# Patient Record
Sex: MALE | Race: WHITE | NOT HISPANIC OR LATINO | Employment: OTHER | URBAN - NONMETROPOLITAN AREA
[De-identification: names, ages, dates, MRNs, and addresses within clinical notes are randomized per-mention and may not be internally consistent; named-entity substitution may affect disease eponyms.]

---

## 2017-04-26 ENCOUNTER — OFFICE VISIT (OUTPATIENT)
Dept: OPHTHALMOLOGY | Facility: CLINIC | Age: 77
End: 2017-04-26

## 2017-04-26 DIAGNOSIS — H25.013 CORTICAL AGE-RELATED CATARACT, BILATERAL: ICD-10-CM

## 2017-04-26 DIAGNOSIS — H40.1131 PRIMARY OPEN ANGLE GLAUCOMA OF BOTH EYES, MILD STAGE: Primary | ICD-10-CM

## 2017-04-26 DIAGNOSIS — IMO0002 NUCLEAR CATARACT, BILATERAL: ICD-10-CM

## 2017-04-26 PROCEDURE — 92133 CPTRZD OPH DX IMG PST SGM ON: CPT | Performed by: OPHTHALMOLOGY

## 2017-04-26 PROCEDURE — 99214 OFFICE O/P EST MOD 30 MIN: CPT | Performed by: OPHTHALMOLOGY

## 2017-04-26 RX ORDER — LATANOPROST 50 UG/ML
1 SOLUTION/ DROPS OPHTHALMIC NIGHTLY
Qty: 1 EACH | Refills: 11 | Status: SHIPPED | OUTPATIENT
Start: 2017-04-26 | End: 2017-08-28 | Stop reason: SDUPTHER

## 2017-04-26 NOTE — PROGRESS NOTES
Rolando De Leon is a 76 y.o. male.   Chief Complaint   Patient presents with   • Cataract   • Glaucoma         Review of Systems    Objective   Base Eye Exam     Visual Acuity (Snellen - Linear)      Right Left   Dist sc 20/40 -2 20/40 -2         Tonometry (Applanation, 2:23 PM)      Right Left   Pressure 16 17         Pupils      Pupils   Right PERRL   Left PERRL         Extraocular Movement      Right Left   Result Full Full         Dilation     Both eyes:  2.5% Edwin Synephrine, 1.0% Mydriacyl @ 2:26 PM            Slit Lamp and Fundus Exam     External Exam      Right Left    External Normal Normal      Slit Lamp Exam      Right Left    Lids/Lashes 2mm nevus central MARIKA margin Normal    Conjunctiva/Sclera White and quiet White and quiet    Cornea Clear Clear    Anterior Chamber Deep and quiet Deep and quiet    Iris Round and reactive Round and reactive    Lens 1+ Nuclear sclerosis, 1+ Cortical cataract 1+ Nuclear sclerosis, 1+ Cortical cataract    Vitreous Normal Normal      Fundus Exam      Right Left    Disc rim 0.15 ST, 0.3 IT, no chnage photo rim 0.1 St, 0.15 IT, no change photo    Macula Normal Normal    Vessels Normal Normal    Periphery Normal Normal              OCT Disc:   OD- relative inferior thinning and not progressed  OS- superior thinning, relative inferior thinning and not progressed    Assessment/Plan   Diagnoses and all orders for this visit:    Primary open angle glaucoma of both eyes, mild stage  -     latanoprost (XALATAN) 0.005 % ophthalmic solution; Administer 1 drop into the left eye Every Night.    Nuclear cataract, bilateral    Cortical age-related cataract, bilateral      Plan:     Continue current drops    Return in about 4 months (around 8/26/2017) for Visual Field 24-2.

## 2017-08-28 ENCOUNTER — OFFICE VISIT (OUTPATIENT)
Dept: OPHTHALMOLOGY | Facility: CLINIC | Age: 77
End: 2017-08-28

## 2017-08-28 DIAGNOSIS — H40.1131 PRIMARY OPEN ANGLE GLAUCOMA OF BOTH EYES, MILD STAGE: Primary | ICD-10-CM

## 2017-08-28 DIAGNOSIS — H25.013 CORTICAL AGE-RELATED CATARACT, BILATERAL: ICD-10-CM

## 2017-08-28 DIAGNOSIS — IMO0002 NUCLEAR CATARACT, BILATERAL: ICD-10-CM

## 2017-08-28 PROCEDURE — 99213 OFFICE O/P EST LOW 20 MIN: CPT | Performed by: OPHTHALMOLOGY

## 2017-08-28 PROCEDURE — 92083 EXTENDED VISUAL FIELD XM: CPT | Performed by: OPHTHALMOLOGY

## 2017-08-28 RX ORDER — LATANOPROST 50 UG/ML
1 SOLUTION/ DROPS OPHTHALMIC NIGHTLY
Qty: 1 EACH | Refills: 11 | Status: SHIPPED | OUTPATIENT
Start: 2017-08-28 | End: 2022-01-01

## 2017-08-28 NOTE — PROGRESS NOTES
Subjective   Eduardo De Leon is a 77 y.o. male.   Chief Complaint   Patient presents with   • Diabetic Eye Exam   • Glaucoma     HPI     Diabetic Eye Exam   Vision: is stable   Associated symptoms: Negative for blurred vision   Diabetes Type: Type 2   Duration: years   Blood Sugars: is controlled           Glaucoma   Laterality: both eyes   Compliance with Treatment: always           Comments   4 month vf 24-2       Last edited by Rod Hanley MD on 8/28/2017  2:14 PM. (History)          Review of Systems    Objective   Base Eye Exam     Visual Acuity (Snellen - Linear)      Right Left   Dist cc 20/60 20/50         Tonometry (Applanation, 2:14 PM)      Right Left   Pressure 16 15               Slit Lamp and Fundus Exam     External Exam      Right Left    External Normal Normal      Slit Lamp Exam      Right Left    Lids/Lashes 2mm nevus central MARIKA margin Normal    Conjunctiva/Sclera White and quiet White and quiet    Cornea Clear Clear    Anterior Chamber Deep and quiet Deep and quiet    Iris Round and reactive Round and reactive    Lens 1+ Nuclear sclerosis, 1+ Cortical cataract 1+ Nuclear sclerosis, 1+ Cortical cataract    Vitreous Normal Normal      Fundus Exam      Right Left    Disc rim 0.15 ST, 0.3 IT, no chnage photo rim 0.1 St, 0.15 IT, no change photo    Macula Normal Normal    Vessels Normal Normal            ./588    Roque Visual Field :   OD- inf temp periph constriction, ? artifact  OS- inf nasal step, no change      Assessment/Plan    Diagnoses and all orders for this visit:    Primary open angle glaucoma of both eyes, mild stage  -     latanoprost (XALATAN) 0.005 % ophthalmic solution; Administer 1 drop into the left eye Every Night.    Nuclear cataract, bilateral    Cortical age-related cataract, bilateral    Plan:   cont latanoprost  rec ophth 4 months

## 2020-06-30 ENCOUNTER — APPOINTMENT (OUTPATIENT)
Dept: CT IMAGING | Facility: HOSPITAL | Age: 80
End: 2020-06-30

## 2020-06-30 ENCOUNTER — HOSPITAL ENCOUNTER (INPATIENT)
Facility: HOSPITAL | Age: 80
LOS: 2 days | Discharge: HOME | End: 2020-07-03
Attending: FAMILY MEDICINE | Admitting: INTERNAL MEDICINE

## 2020-06-30 ENCOUNTER — APPOINTMENT (OUTPATIENT)
Dept: GENERAL RADIOLOGY | Facility: HOSPITAL | Age: 80
End: 2020-06-30

## 2020-06-30 DIAGNOSIS — R40.4 TRANSIENT ALTERATION OF AWARENESS: Primary | ICD-10-CM

## 2020-06-30 DIAGNOSIS — Z74.09 IMPAIRED MOBILITY AND ACTIVITIES OF DAILY LIVING: ICD-10-CM

## 2020-06-30 DIAGNOSIS — E83.42 HYPOMAGNESEMIA: ICD-10-CM

## 2020-06-30 DIAGNOSIS — Z78.9 IMPAIRED MOBILITY AND ACTIVITIES OF DAILY LIVING: ICD-10-CM

## 2020-06-30 DIAGNOSIS — R91.8 OPACITY OF LUNG ON IMAGING STUDY: ICD-10-CM

## 2020-06-30 DIAGNOSIS — R26.89 DECREASED FUNCTIONAL MOBILITY: ICD-10-CM

## 2020-06-30 LAB
ALBUMIN SERPL-MCNC: 4.4 G/DL (ref 3.5–5.2)
ALBUMIN/GLOB SERPL: 1.3 G/DL
ALP SERPL-CCNC: 68 U/L (ref 39–117)
ALT SERPL W P-5'-P-CCNC: 6 U/L (ref 1–41)
AMPHET+METHAMPHET UR QL: NEGATIVE
AMPHETAMINES UR QL: NEGATIVE
ANION GAP SERPL CALCULATED.3IONS-SCNC: 16 MMOL/L (ref 5–15)
AST SERPL-CCNC: 17 U/L (ref 1–40)
B PERT DNA SPEC QL NAA+PROBE: NOT DETECTED
BACTERIA UR QL AUTO: ABNORMAL /HPF
BARBITURATES UR QL SCN: NEGATIVE
BASOPHILS # BLD AUTO: 0.06 10*3/MM3 (ref 0–0.2)
BASOPHILS NFR BLD AUTO: 0.5 % (ref 0–1.5)
BENZODIAZ UR QL SCN: NEGATIVE
BILIRUB SERPL-MCNC: 0.3 MG/DL (ref 0.2–1.2)
BILIRUB UR QL STRIP: NEGATIVE
BUN SERPL-MCNC: 20 MG/DL (ref 8–23)
BUN/CREAT SERPL: 16.1 (ref 7–25)
BUPRENORPHINE SERPL-MCNC: NEGATIVE NG/ML
C PNEUM DNA NPH QL NAA+NON-PROBE: NOT DETECTED
CALCIUM SPEC-SCNC: 10 MG/DL (ref 8.6–10.5)
CANNABINOIDS SERPL QL: NEGATIVE
CHLORIDE SERPL-SCNC: 102 MMOL/L (ref 98–107)
CK SERPL-CCNC: 349 U/L (ref 20–200)
CLARITY UR: CLEAR
CO2 SERPL-SCNC: 22 MMOL/L (ref 22–29)
COCAINE UR QL: NEGATIVE
COLOR UR: YELLOW
CREAT SERPL-MCNC: 1.24 MG/DL (ref 0.76–1.27)
CRP SERPL-MCNC: 3.77 MG/DL (ref 0–0.5)
D-LACTATE SERPL-SCNC: 1.9 MMOL/L (ref 0.5–2)
DEPRECATED RDW RBC AUTO: 47.5 FL (ref 37–54)
EOSINOPHIL # BLD AUTO: 0.02 10*3/MM3 (ref 0–0.4)
EOSINOPHIL NFR BLD AUTO: 0.2 % (ref 0.3–6.2)
ERYTHROCYTE [DISTWIDTH] IN BLOOD BY AUTOMATED COUNT: 14.5 % (ref 12.3–15.4)
ETHANOL BLD-MCNC: <10 MG/DL (ref 0–10)
ETHANOL UR QL: <0.01 %
FLUAV H1 2009 PAND RNA NPH QL NAA+PROBE: NOT DETECTED
FLUAV H1 HA GENE NPH QL NAA+PROBE: NOT DETECTED
FLUAV H3 RNA NPH QL NAA+PROBE: NOT DETECTED
FLUAV SUBTYP SPEC NAA+PROBE: NOT DETECTED
FLUBV RNA ISLT QL NAA+PROBE: NOT DETECTED
GFR SERPL CREATININE-BSD FRML MDRD: 57 ML/MIN/1.73
GLOBULIN UR ELPH-MCNC: 3.3 GM/DL
GLUCOSE SERPL-MCNC: 143 MG/DL (ref 65–99)
GLUCOSE UR STRIP-MCNC: NEGATIVE MG/DL
HADV DNA SPEC NAA+PROBE: NOT DETECTED
HCOV 229E RNA SPEC QL NAA+PROBE: NOT DETECTED
HCOV HKU1 RNA SPEC QL NAA+PROBE: NOT DETECTED
HCOV NL63 RNA SPEC QL NAA+PROBE: NOT DETECTED
HCOV OC43 RNA SPEC QL NAA+PROBE: NOT DETECTED
HCT VFR BLD AUTO: 35.7 % (ref 37.5–51)
HGB BLD-MCNC: 11.5 G/DL (ref 13–17.7)
HGB UR QL STRIP.AUTO: ABNORMAL
HMPV RNA NPH QL NAA+NON-PROBE: NOT DETECTED
HOLD SPECIMEN: NORMAL
HOLD SPECIMEN: NORMAL
HPIV1 RNA SPEC QL NAA+PROBE: NOT DETECTED
HPIV2 RNA SPEC QL NAA+PROBE: NOT DETECTED
HPIV3 RNA NPH QL NAA+PROBE: NOT DETECTED
HPIV4 P GENE NPH QL NAA+PROBE: NOT DETECTED
HYALINE CASTS UR QL AUTO: ABNORMAL /LPF
IMM GRANULOCYTES # BLD AUTO: 0.03 10*3/MM3 (ref 0–0.05)
IMM GRANULOCYTES NFR BLD AUTO: 0.3 % (ref 0–0.5)
KETONES UR QL STRIP: ABNORMAL
LDH SERPL-CCNC: 166 U/L (ref 135–225)
LEUKOCYTE ESTERASE UR QL STRIP.AUTO: NEGATIVE
LIPASE SERPL-CCNC: 45 U/L (ref 13–60)
LYMPHOCYTES # BLD AUTO: 1.49 10*3/MM3 (ref 0.7–3.1)
LYMPHOCYTES NFR BLD AUTO: 12.7 % (ref 19.6–45.3)
M PNEUMO IGG SER IA-ACNC: NOT DETECTED
MAGNESIUM SERPL-MCNC: 1.5 MG/DL (ref 1.6–2.4)
MCH RBC QN AUTO: 28.9 PG (ref 26.6–33)
MCHC RBC AUTO-ENTMCNC: 32.2 G/DL (ref 31.5–35.7)
MCV RBC AUTO: 89.7 FL (ref 79–97)
METHADONE UR QL SCN: NEGATIVE
MONOCYTES # BLD AUTO: 0.85 10*3/MM3 (ref 0.1–0.9)
MONOCYTES NFR BLD AUTO: 7.3 % (ref 5–12)
NEUTROPHILS NFR BLD AUTO: 79 % (ref 42.7–76)
NEUTROPHILS NFR BLD AUTO: 9.26 10*3/MM3 (ref 1.7–7)
NITRITE UR QL STRIP: NEGATIVE
NRBC BLD AUTO-RTO: 0 /100 WBC (ref 0–0.2)
NT-PROBNP SERPL-MCNC: 1107 PG/ML (ref 5–1800)
OPIATES UR QL: POSITIVE
OXYCODONE UR QL SCN: NEGATIVE
PCP UR QL SCN: NEGATIVE
PH UR STRIP.AUTO: 7.5 [PH] (ref 5–9)
PLATELET # BLD AUTO: 309 10*3/MM3 (ref 140–450)
PMV BLD AUTO: 11 FL (ref 6–12)
POTASSIUM SERPL-SCNC: 4 MMOL/L (ref 3.5–5.2)
PROCALCITONIN SERPL-MCNC: 0.14 NG/ML (ref 0.1–0.25)
PROPOXYPH UR QL: NEGATIVE
PROT SERPL-MCNC: 7.7 G/DL (ref 6–8.5)
PROT UR QL STRIP: NEGATIVE
RBC # BLD AUTO: 3.98 10*6/MM3 (ref 4.14–5.8)
RBC # UR: ABNORMAL /HPF
REF LAB TEST METHOD: ABNORMAL
RHINOVIRUS RNA SPEC NAA+PROBE: NOT DETECTED
RSV RNA NPH QL NAA+NON-PROBE: NOT DETECTED
SODIUM SERPL-SCNC: 140 MMOL/L (ref 136–145)
SP GR UR STRIP: 1.01 (ref 1–1.03)
SQUAMOUS #/AREA URNS HPF: ABNORMAL /HPF
TRICYCLICS UR QL SCN: NEGATIVE
TROPONIN T SERPL-MCNC: 0.01 NG/ML (ref 0–0.03)
UROBILINOGEN UR QL STRIP: ABNORMAL
WBC # BLD AUTO: 11.71 10*3/MM3 (ref 3.4–10.8)
WBC UR QL AUTO: ABNORMAL /HPF
WHOLE BLOOD HOLD SPECIMEN: NORMAL
WHOLE BLOOD HOLD SPECIMEN: NORMAL

## 2020-06-30 PROCEDURE — 93005 ELECTROCARDIOGRAM TRACING: CPT | Performed by: FAMILY MEDICINE

## 2020-06-30 PROCEDURE — G0378 HOSPITAL OBSERVATION PER HR: HCPCS

## 2020-06-30 PROCEDURE — 87635 SARS-COV-2 COVID-19 AMP PRB: CPT | Performed by: PHYSICIAN ASSISTANT

## 2020-06-30 PROCEDURE — 80307 DRUG TEST PRSMV CHEM ANLYZR: CPT | Performed by: PHYSICIAN ASSISTANT

## 2020-06-30 PROCEDURE — P9612 CATHETERIZE FOR URINE SPEC: HCPCS

## 2020-06-30 PROCEDURE — 86140 C-REACTIVE PROTEIN: CPT | Performed by: PHYSICIAN ASSISTANT

## 2020-06-30 PROCEDURE — 93010 ELECTROCARDIOGRAM REPORT: CPT | Performed by: INTERNAL MEDICINE

## 2020-06-30 PROCEDURE — 99285 EMERGENCY DEPT VISIT HI MDM: CPT

## 2020-06-30 PROCEDURE — 83880 ASSAY OF NATRIURETIC PEPTIDE: CPT | Performed by: PHYSICIAN ASSISTANT

## 2020-06-30 PROCEDURE — 71045 X-RAY EXAM CHEST 1 VIEW: CPT

## 2020-06-30 PROCEDURE — 80053 COMPREHEN METABOLIC PANEL: CPT | Performed by: PHYSICIAN ASSISTANT

## 2020-06-30 PROCEDURE — 87040 BLOOD CULTURE FOR BACTERIA: CPT | Performed by: PHYSICIAN ASSISTANT

## 2020-06-30 PROCEDURE — 81001 URINALYSIS AUTO W/SCOPE: CPT | Performed by: PHYSICIAN ASSISTANT

## 2020-06-30 PROCEDURE — 83690 ASSAY OF LIPASE: CPT | Performed by: PHYSICIAN ASSISTANT

## 2020-06-30 PROCEDURE — 70450 CT HEAD/BRAIN W/O DYE: CPT

## 2020-06-30 PROCEDURE — 25010000002 CEFTRIAXONE: Performed by: PHYSICIAN ASSISTANT

## 2020-06-30 PROCEDURE — 83735 ASSAY OF MAGNESIUM: CPT | Performed by: EMERGENCY MEDICINE

## 2020-06-30 PROCEDURE — 25010000002 MAGNESIUM SULFATE 2 GM/50ML SOLUTION: Performed by: PHYSICIAN ASSISTANT

## 2020-06-30 PROCEDURE — 82550 ASSAY OF CK (CPK): CPT | Performed by: PHYSICIAN ASSISTANT

## 2020-06-30 PROCEDURE — 84145 PROCALCITONIN (PCT): CPT | Performed by: PHYSICIAN ASSISTANT

## 2020-06-30 PROCEDURE — 84484 ASSAY OF TROPONIN QUANT: CPT | Performed by: PHYSICIAN ASSISTANT

## 2020-06-30 PROCEDURE — 83615 LACTATE (LD) (LDH) ENZYME: CPT | Performed by: PHYSICIAN ASSISTANT

## 2020-06-30 PROCEDURE — 85025 COMPLETE CBC W/AUTO DIFF WBC: CPT | Performed by: PHYSICIAN ASSISTANT

## 2020-06-30 PROCEDURE — 83605 ASSAY OF LACTIC ACID: CPT | Performed by: EMERGENCY MEDICINE

## 2020-06-30 PROCEDURE — 0099U HC BIOFIRE FILMARRAY RESP PANEL 1: CPT | Performed by: PHYSICIAN ASSISTANT

## 2020-06-30 RX ORDER — MAGNESIUM SULFATE HEPTAHYDRATE 40 MG/ML
2 INJECTION, SOLUTION INTRAVENOUS ONCE
Status: COMPLETED | OUTPATIENT
Start: 2020-06-30 | End: 2020-06-30

## 2020-06-30 RX ORDER — MORPHINE SULFATE 30 MG/1
15 TABLET, FILM COATED, EXTENDED RELEASE ORAL EVERY 8 HOURS SCHEDULED
COMMUNITY
Start: 2020-02-12 | End: 2022-01-01 | Stop reason: HOSPADM

## 2020-06-30 RX ORDER — GABAPENTIN 300 MG/1
300 CAPSULE ORAL EVERY 12 HOURS SCHEDULED
Status: DISCONTINUED | OUTPATIENT
Start: 2020-06-30 | End: 2020-07-03 | Stop reason: HOSPADM

## 2020-06-30 RX ORDER — GABAPENTIN 600 MG/1
600 TABLET ORAL 3 TIMES DAILY
COMMUNITY
Start: 2020-02-13 | End: 2022-01-01

## 2020-06-30 RX ORDER — SODIUM CHLORIDE 0.9 % (FLUSH) 0.9 %
10 SYRINGE (ML) INJECTION AS NEEDED
Status: DISCONTINUED | OUTPATIENT
Start: 2020-06-30 | End: 2020-07-03 | Stop reason: HOSPADM

## 2020-06-30 RX ORDER — PANTOPRAZOLE SODIUM 40 MG/1
40 TABLET, DELAYED RELEASE ORAL
COMMUNITY
Start: 2019-07-02 | End: 2020-07-03 | Stop reason: HOSPADM

## 2020-06-30 RX ORDER — CLOPIDOGREL BISULFATE 75 MG/1
75 TABLET ORAL DAILY
Status: DISCONTINUED | OUTPATIENT
Start: 2020-07-01 | End: 2020-07-03 | Stop reason: HOSPADM

## 2020-06-30 RX ORDER — NICOTINE POLACRILEX 4 MG
15 LOZENGE BUCCAL
Status: DISCONTINUED | OUTPATIENT
Start: 2020-06-30 | End: 2020-07-03 | Stop reason: HOSPADM

## 2020-06-30 RX ORDER — SODIUM CHLORIDE 0.9 % (FLUSH) 0.9 %
10 SYRINGE (ML) INJECTION EVERY 12 HOURS SCHEDULED
Status: DISCONTINUED | OUTPATIENT
Start: 2020-06-30 | End: 2020-07-03 | Stop reason: HOSPADM

## 2020-06-30 RX ORDER — NIFEDIPINE 60 MG/1
60 TABLET, EXTENDED RELEASE ORAL DAILY
COMMUNITY
Start: 2020-05-05 | End: 2021-05-06

## 2020-06-30 RX ORDER — ASPIRIN 81 MG/1
81 TABLET ORAL DAILY
Status: DISCONTINUED | OUTPATIENT
Start: 2020-07-01 | End: 2020-07-03 | Stop reason: HOSPADM

## 2020-06-30 RX ORDER — DEXTROSE MONOHYDRATE 25 G/50ML
25 INJECTION, SOLUTION INTRAVENOUS
Status: DISCONTINUED | OUTPATIENT
Start: 2020-06-30 | End: 2020-07-03 | Stop reason: HOSPADM

## 2020-06-30 RX ORDER — SODIUM CHLORIDE 0.9 % (FLUSH) 0.9 %
10 SYRINGE (ML) INJECTION AS NEEDED
Status: DISCONTINUED | OUTPATIENT
Start: 2020-06-30 | End: 2020-06-30

## 2020-06-30 RX ORDER — ESCITALOPRAM OXALATE 10 MG/1
10 TABLET ORAL DAILY
Status: DISCONTINUED | OUTPATIENT
Start: 2020-07-01 | End: 2020-07-03 | Stop reason: HOSPADM

## 2020-06-30 RX ORDER — ATORVASTATIN CALCIUM 20 MG/1
20 TABLET, FILM COATED ORAL DAILY
Status: DISCONTINUED | OUTPATIENT
Start: 2020-07-01 | End: 2020-07-03 | Stop reason: HOSPADM

## 2020-06-30 RX ORDER — HYDROMORPHONE HYDROCHLORIDE 4 MG/1
4 TABLET ORAL EVERY 6 HOURS PRN
Status: DISCONTINUED | OUTPATIENT
Start: 2020-06-30 | End: 2020-07-03 | Stop reason: HOSPADM

## 2020-06-30 RX ORDER — FENOFIBRATE 145 MG/1
145 TABLET, COATED ORAL DAILY
Status: DISCONTINUED | OUTPATIENT
Start: 2020-07-01 | End: 2020-07-03 | Stop reason: HOSPADM

## 2020-06-30 RX ADMIN — Medication 10 ML: at 18:07

## 2020-06-30 RX ADMIN — MAGNESIUM SULFATE HEPTAHYDRATE 2 G: 40 INJECTION, SOLUTION INTRAVENOUS at 21:44

## 2020-06-30 RX ADMIN — CEFTRIAXONE 1 G: 1 INJECTION, POWDER, FOR SOLUTION INTRAMUSCULAR; INTRAVENOUS at 21:44

## 2020-07-01 PROBLEM — I10 ESSENTIAL HYPERTENSION: Status: ACTIVE | Noted: 2020-07-01

## 2020-07-01 PROBLEM — T40.601A NARCOTIC OVERDOSE (HCC): Status: ACTIVE | Noted: 2020-07-01

## 2020-07-01 PROBLEM — E11.9 TYPE 2 DIABETES MELLITUS (HCC): Status: ACTIVE | Noted: 2020-07-01

## 2020-07-01 PROBLEM — J18.9 CAP (COMMUNITY ACQUIRED PNEUMONIA): Status: ACTIVE | Noted: 2020-07-01

## 2020-07-01 LAB
ANION GAP SERPL CALCULATED.3IONS-SCNC: 11 MMOL/L (ref 5–15)
BASOPHILS # BLD AUTO: 0.05 10*3/MM3 (ref 0–0.2)
BASOPHILS NFR BLD AUTO: 0.7 % (ref 0–1.5)
BUN SERPL-MCNC: 20 MG/DL (ref 8–23)
BUN/CREAT SERPL: 20 (ref 7–25)
CALCIUM SPEC-SCNC: 9.4 MG/DL (ref 8.6–10.5)
CHLORIDE SERPL-SCNC: 105 MMOL/L (ref 98–107)
CO2 SERPL-SCNC: 23 MMOL/L (ref 22–29)
CREAT SERPL-MCNC: 1 MG/DL (ref 0.76–1.27)
DEPRECATED RDW RBC AUTO: 45.6 FL (ref 37–54)
EOSINOPHIL # BLD AUTO: 0.06 10*3/MM3 (ref 0–0.4)
EOSINOPHIL NFR BLD AUTO: 0.8 % (ref 0.3–6.2)
ERYTHROCYTE [DISTWIDTH] IN BLOOD BY AUTOMATED COUNT: 14.1 % (ref 12.3–15.4)
GFR SERPL CREATININE-BSD FRML MDRD: 72 ML/MIN/1.73
GLUCOSE BLDC GLUCOMTR-MCNC: 106 MG/DL (ref 70–130)
GLUCOSE BLDC GLUCOMTR-MCNC: 107 MG/DL (ref 70–130)
GLUCOSE BLDC GLUCOMTR-MCNC: 126 MG/DL (ref 70–130)
GLUCOSE SERPL-MCNC: 113 MG/DL (ref 65–99)
HCT VFR BLD AUTO: 30 % (ref 37.5–51)
HGB BLD-MCNC: 9.9 G/DL (ref 13–17.7)
IMM GRANULOCYTES # BLD AUTO: 0.03 10*3/MM3 (ref 0–0.05)
IMM GRANULOCYTES NFR BLD AUTO: 0.4 % (ref 0–0.5)
LYMPHOCYTES # BLD AUTO: 1.48 10*3/MM3 (ref 0.7–3.1)
LYMPHOCYTES NFR BLD AUTO: 19.5 % (ref 19.6–45.3)
MCH RBC QN AUTO: 29.2 PG (ref 26.6–33)
MCHC RBC AUTO-ENTMCNC: 33 G/DL (ref 31.5–35.7)
MCV RBC AUTO: 88.5 FL (ref 79–97)
MONOCYTES # BLD AUTO: 0.63 10*3/MM3 (ref 0.1–0.9)
MONOCYTES NFR BLD AUTO: 8.3 % (ref 5–12)
NEUTROPHILS NFR BLD AUTO: 5.34 10*3/MM3 (ref 1.7–7)
NEUTROPHILS NFR BLD AUTO: 70.3 % (ref 42.7–76)
NRBC BLD AUTO-RTO: 0 /100 WBC (ref 0–0.2)
PHOSPHATE SERPL-MCNC: 2.7 MG/DL (ref 2.5–4.5)
PLATELET # BLD AUTO: 243 10*3/MM3 (ref 140–450)
PMV BLD AUTO: 11 FL (ref 6–12)
POTASSIUM SERPL-SCNC: 3.9 MMOL/L (ref 3.5–5.2)
PROCALCITONIN SERPL-MCNC: 0.2 NG/ML (ref 0.1–0.25)
RBC # BLD AUTO: 3.39 10*6/MM3 (ref 4.14–5.8)
SARS-COV-2 N GENE RESP QL NAA+PROBE: NOT DETECTED
SODIUM SERPL-SCNC: 139 MMOL/L (ref 136–145)
WBC # BLD AUTO: 7.59 10*3/MM3 (ref 3.4–10.8)

## 2020-07-01 PROCEDURE — 84145 PROCALCITONIN (PCT): CPT | Performed by: INTERNAL MEDICINE

## 2020-07-01 PROCEDURE — 84100 ASSAY OF PHOSPHORUS: CPT | Performed by: INTERNAL MEDICINE

## 2020-07-01 PROCEDURE — 25010000002 CEFTRIAXONE PER 250 MG: Performed by: INTERNAL MEDICINE

## 2020-07-01 PROCEDURE — 87040 BLOOD CULTURE FOR BACTERIA: CPT | Performed by: NURSE PRACTITIONER

## 2020-07-01 PROCEDURE — 25010000002 ENOXAPARIN PER 10 MG: Performed by: INTERNAL MEDICINE

## 2020-07-01 PROCEDURE — 82962 GLUCOSE BLOOD TEST: CPT

## 2020-07-01 PROCEDURE — 97166 OT EVAL MOD COMPLEX 45 MIN: CPT

## 2020-07-01 PROCEDURE — 80048 BASIC METABOLIC PNL TOTAL CA: CPT | Performed by: INTERNAL MEDICINE

## 2020-07-01 PROCEDURE — 85025 COMPLETE CBC W/AUTO DIFF WBC: CPT | Performed by: INTERNAL MEDICINE

## 2020-07-01 RX ORDER — AZITHROMYCIN 250 MG/1
500 TABLET, FILM COATED ORAL EVERY 24 HOURS
Status: DISCONTINUED | OUTPATIENT
Start: 2020-07-01 | End: 2020-07-03 | Stop reason: HOSPADM

## 2020-07-01 RX ADMIN — SODIUM CHLORIDE, PRESERVATIVE FREE 10 ML: 5 INJECTION INTRAVENOUS at 08:26

## 2020-07-01 RX ADMIN — FENOFIBRATE 145 MG: 145 TABLET ORAL at 08:26

## 2020-07-01 RX ADMIN — AZITHROMYCIN MONOHYDRATE 500 MG: 250 TABLET ORAL at 15:09

## 2020-07-01 RX ADMIN — GABAPENTIN 300 MG: 300 CAPSULE ORAL at 00:15

## 2020-07-01 RX ADMIN — GABAPENTIN 300 MG: 300 CAPSULE ORAL at 08:26

## 2020-07-01 RX ADMIN — ATORVASTATIN CALCIUM 20 MG: 20 TABLET, FILM COATED ORAL at 08:26

## 2020-07-01 RX ADMIN — ENOXAPARIN SODIUM 40 MG: 40 INJECTION SUBCUTANEOUS at 00:15

## 2020-07-01 RX ADMIN — GABAPENTIN 300 MG: 300 CAPSULE ORAL at 21:32

## 2020-07-01 RX ADMIN — SODIUM CHLORIDE, PRESERVATIVE FREE 10 ML: 5 INJECTION INTRAVENOUS at 00:15

## 2020-07-01 RX ADMIN — CEFTRIAXONE SODIUM 1 G: 1 INJECTION, POWDER, FOR SOLUTION INTRAMUSCULAR; INTRAVENOUS at 21:32

## 2020-07-01 RX ADMIN — ENOXAPARIN SODIUM 40 MG: 40 INJECTION SUBCUTANEOUS at 21:32

## 2020-07-01 RX ADMIN — SODIUM CHLORIDE, PRESERVATIVE FREE 10 ML: 5 INJECTION INTRAVENOUS at 21:37

## 2020-07-01 RX ADMIN — ASPIRIN 81 MG: 81 TABLET, COATED ORAL at 08:26

## 2020-07-01 RX ADMIN — ESCITALOPRAM OXALATE 10 MG: 10 TABLET ORAL at 08:26

## 2020-07-01 RX ADMIN — CLOPIDOGREL BISULFATE 75 MG: 75 TABLET ORAL at 08:26

## 2020-07-01 NOTE — H&P
[facility]      Date of Admission: 6/30/2020      Primary Care Physician: Zelalem Diaz MD      Chief Complaint: Sent in by his wife with mental status changes    HPI: Briefly this is a 79-year-old gentleman who presents to the ED attending following problem list    1.  Diabetes mellitus with metabolic syndrome  -Stage I CKD with microalbuminuria  2.  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  History of chronic pain on chronic narcotics    Patient was found down at home received Narcan with improved mental status, however ex-wife state he was not quite back to his baseline.  He presented to the ED and found to have marked hypoxia as well as a history of a left hilar infiltrate with mild leukocytosis without lymphopenia.  Placed on low-flow O2 with adequate O2 sat with no status improving.  His respiratory viral panel was negative with COVID-19 pending.  He was loaded with Rocephin and we been asked to admit for further evaluation and treatment.  Patient not able to give me any history at all.  His wife discussed that he is definitely having issues in terms of his memory with diagnosis of dementia    Concurrent Medical History:  has a past medical history of Borderline glaucoma, Colon polyp, Cortical senile cataract, Diarrhea, Diarrhea of presumed infectious origin, History of echocardiogram (06/02/2014), Inguinal pain, Nuclear cataract, Primary open angle glaucoma, and Right inguinal hernia.    Past Surgical History:  has a past surgical history that includes Colonoscopy (05/08/2014); Other surgical history (07/26/2016); Other surgical history (06/21/2016); and Other surgical history (06/21/2016).    Family History: family history includes Diabetes in an other family member; Heart disease in an other family member; Hypertension in an other family member; Lung cancer in his mother.     Social History:  reports that he has  been smoking. He does not have any smokeless tobacco history on file. He reports that he does not drink alcohol.    Allergies:   Allergies   Allergen Reactions   • Iodinated Diagnostic Agents        Medications:   Prior to Admission medications    Medication Sig Start Date End Date Taking? Authorizing Provider   aspirin 81 MG EC tablet Take 81 mg by mouth.    Magno Negro MD   aspirin 81 MG tablet Take 81 mg by mouth daily. 7/26/16   Magno Negro MD   atorvastatin (LIPITOR) 20 MG tablet Take 20 mg by mouth daily. 7/26/16   Magno Negro MD   Calcium 250 MG capsule Take  by mouth.    Magno Negro MD   Calcium Carb-Cholecalciferol (CALCIUM CARBONATE-VITAMIN D3) 600-400 MG-UNIT tablet Take 2 tablets by mouth daily. 7/26/16   Magno Negro MD   Cholecalciferol 1000 UNITS tablet Take 1,000 Units by mouth daily. 7/26/16   Magno Negro MD   Cholecalciferol 2000 UNITS tablet Take  by mouth.    Magno Negro MD   clopidogrel (PLAVIX) 75 MG tablet Take 75 mg by mouth daily. 7/26/16   Magno Negro MD   colestipol (COLESTID) 1 G tablet Take 1 g by mouth 3 (three) times a day. 7/26/16   Magno Negro MD   escitalopram (LEXAPRO) 10 MG tablet Take 10 mg by mouth daily. 7/26/16   Magno Negro MD   fenofibrate 160 MG tablet Take 160 mg by mouth daily. 7/26/16   Magno Negro MD   gabapentin (NEURONTIN) 300 MG capsule Take 300 mg by mouth 2 (two) times a day. 7/26/06   Magno Negro MD   glucose blood test strip 1 strip. 2/3/15   Magno Negro MD   HYDROmorphone (DILAUDID) 4 MG tablet Take 4 mg by mouth every 6 (six) hours as needed for moderate pain (4-6). 7/26/06   Magno Negro MD   latanoprost (XALATAN) 0.005 % ophthalmic solution Administer 1 drop into the left eye Every Night. 8/28/17   Rod Hanley MD   lisinopril (PRINIVIL,ZESTRIL) 5 MG tablet TK 1 T PO QAM 7/5/16   Magno Negro MD    metFORMIN (GLUCOPHAGE) 1000 MG tablet Take 500 mg by mouth 2 (two) times a day with meals. 7/26/16   Magno Negro MD   metFORMIN (GLUCOPHAGE) 500 MG tablet TK 1 T PO BID WITH MEALS 7/13/16   Magno Negro MD   metoprolol tartrate (LOPRESSOR) 50 MG tablet Take 25 mg by mouth daily. 7/26/16   Magno Negro MD   nitroglycerin (NITROSTAT) 0.4 MG SL tablet Place 0.4 mg under the tongue as needed for chest pain. 1 tablet, sublingual as needed Sublingual PRN 7/26/16   Magno Negro MD   promethazine (PHENERGAN) 25 MG tablet Take 25 mg by mouth every 6 (six) hours as needed for nausea or vomiting. 7/26/16   Magno Negro MD   quinapril (ACCUPRIL) 20 MG tablet Take 20 mg by mouth daily. 7/26/16   Magno Negro MD       Review of Systems:  Review of Systems   Otherwise complete ROS is negative except as mentioned above.    Physical Exam:   Temp:  [100.3 °F (37.9 °C)-100.4 °F (38 °C)] 100.3 °F (37.9 °C)  Heart Rate:  [] 94  Resp:  [18-20] 20  BP: (107-157)/() 148/64  Physical Exam  Patient without focal deficits  Skin without rash or livedo  HEENT symmetric face  Neck supple without lymphadenopathy thyromegaly thorax nontender lungs decreased breath sounds in the bases cardiovascular regular rhythm without ectopy abdominal soft no masses organomegaly with bowel sounds.  Normoactive throughout extremities reveal no rash livedo mottling clubbing.  Neuro as above without focal findings    Results Reviewed:  I have personally reviewed current lab, radiology, and data and agree with results.  Lab Results (last 24 hours)     Procedure Component Value Units Date/Time    Respiratory Panel, PCR - Swab, Nasopharynx [[id number]]  (Normal) Collected:  06/30/20 1802    Specimen:  Swab from Nasopharynx Updated:  06/30/20 1953     ADENOVIRUS, PCR Not Detected     Coronavirus 229E Not Detected     Coronavirus HKU1 Not Detected     Coronavirus NL63 Not Detected     Coronavirus OC43  Not Detected     Human Metapneumovirus Not Detected     Human Rhinovirus/Enterovirus Not Detected     Influenza B PCR Not Detected     Parainfluenza Virus 1 Not Detected     Parainfluenza Virus 2 Not Detected     Parainfluenza Virus 3 Not Detected     Parainfluenza Virus 4 Not Detected     Bordetella pertussis pcr Not Detected     Influenza A H1 2009 PCR Not Detected     Chlamydophila pneumoniae PCR Not Detected     Mycoplasma pneumo by PCR Not Detected     Influenza A PCR Not Detected     Influenza A H3 Not Detected     Influenza A H1 Not Detected     RSV, PCR Not Detected    Narrative:       The coronavirus on the RVP is NOT COVID-19 and is NOT indicative of infection with COVID-19.     Magnesium [[id number]]  (Abnormal) Collected:  06/30/20 1801    Specimen:  Blood Updated:  06/30/20 1916     Magnesium 1.5 mg/dL     [place] [facility] [[id number]] Collected:  06/30/20 1801    Specimen:  Blood Updated:  06/30/20 1915    Narrative:       The following orders were created for panel order [place] [facility].  Procedure                               Abnormality         Status                     ---------                               -----------         ------                     Light Blue Top[[id number]]                                   Final result               Green Top (Gel)[[id number]]                                  Final result               Lavender Top[[id number]]                                     Final result               Gold Top - SST[[id number]]                                   Final result                 Please view results for these tests on the individual orders.    Light Blue Top [[id number]] Collected:  06/30/20 1801    Specimen:  Blood Updated:  06/30/20 1915     Extra Tube hold for add-on     Comment: Auto resulted       Green Top (Gel) [[id number]] Collected:  06/30/20 1801    Specimen:  Blood Updated:  06/30/20 1915     Extra Tube Hold for add-ons.     Comment: Auto resulted.       Lavender Top [[id number]]  "Collected:  06/30/20 1801    Specimen:  Blood Updated:  06/30/20 1915     Extra Tube hold for add-on     Comment: Auto resulted       Gold Top - SST [[id number]] Collected:  06/30/20 1801    Specimen:  Blood Updated:  06/30/20 1915     Extra Tube Hold for add-ons.     Comment: Auto resulted.       COVID-19, [facility] IN-HOUSE, NP SWAB IN TRANSPORT MEDIA 8-10 HR TAT - Swab, Nasopharynx [[id number]] Collected:  06/30/20 1802    Specimen:  Swab from Nasopharynx Updated:  06/30/20 1837    Procalcitonin [[id number]]  (Normal) Collected:  06/30/20 1801    Specimen:  Blood Updated:  06/30/20 1836     Procalcitonin 0.14 ng/mL     Narrative:       As a Marker for Sepsis (Non-Neonates):   1. <0.5 ng/mL represents a low risk of severe sepsis and/or septic shock.  1. >2 ng/mL represents a high risk of severe sepsis and/or septic shock.    As a Marker for Lower Respiratory Tract Infections that require antibiotic therapy:  PCT on Admission     Antibiotic Therapy             6-12 Hrs later  > 0.5                Strongly Recommended            >0.25 - <0.5         Recommended  0.1 - 0.25           Discouraged                   Remeasure/reassess PCT  <0.1                 Strongly Discouraged          Remeasure/reassess PCT      As 28 day mortality risk marker: \"Change in Procalcitonin Result\" (> 80 % or <=80 %) if Day 0 (or Day 1) and Day 4 values are available. Refer to http://www.[facility]-pct-calculator.com/   Change in PCT <=80 %   A decrease of PCT levels below or equal to 80 % defines a positive change in PCT test result representing a higher risk for 28-day all-cause mortality of patients diagnosed with severe sepsis or septic shock.  Change in PCT > 80 %   A decrease of PCT levels of more than 80 % defines a negative change in PCT result representing a lower risk for 28-day all-cause mortality of patients diagnosed with severe sepsis or septic shock.                Results may be falsely decreased if patient taking Biotin.     " Comprehensive Metabolic Panel [[id number]]  (Abnormal) Collected:  06/30/20 1801    Specimen:  Blood Updated:  06/30/20 1831     Glucose 143 mg/dL      BUN 20 mg/dL      Creatinine 1.24 mg/dL      Sodium 140 mmol/L      Potassium 4.0 mmol/L      Chloride 102 mmol/L      CO2 22.0 mmol/L      Calcium 10.0 mg/dL      Total Protein 7.7 g/dL      Albumin 4.40 g/dL      ALT (SGPT) 6 U/L      AST (SGOT) 17 U/L      Alkaline Phosphatase 68 U/L      Total Bilirubin 0.3 mg/dL      eGFR Non African Amer 57 mL/min/1.73      Globulin 3.3 gm/dL      A/G Ratio 1.3 g/dL      BUN/Creatinine Ratio 16.1     Anion Gap 16.0 mmol/L     Narrative:       GFR Normal >60  Chronic Kidney Disease <60  Kidney Failure <15      Lactate Dehydrogenase [[id number]]  (Normal) Collected:  06/30/20 1801    Specimen:  Blood Updated:  06/30/20 1831      U/L     Troponin [[id number]]  (Normal) Collected:  06/30/20 1801    Specimen:  Blood Updated:  06/30/20 1831     Troponin T 0.013 ng/mL     Narrative:       Troponin T Reference Range:  <= 0.03 ng/mL-   Negative for AMI  >0.03 ng/mL-     Abnormal for myocardial necrosis.  Clinicians would have to utilize clinical acumen, EKG, Troponin and serial changes to determine if it is an Acute Myocardial Infarction or myocardial injury due to an underlying chronic condition.       Results may be falsely decreased if patient taking Biotin.      Lipase [[id number]]  (Normal) Collected:  06/30/20 1801    Specimen:  Blood Updated:  06/30/20 1831     Lipase 45 U/L     CK [[id number]]  (Abnormal) Collected:  06/30/20 1801    Specimen:  Blood Updated:  06/30/20 1831     Creatine Kinase 349 U/L     Ethanol [[id number]] Collected:  06/30/20 1801    Specimen:  Blood Updated:  06/30/20 1831     Ethanol <10 mg/dL      Ethanol % <0.010 %     C-reactive Protein [[id number]]  (Abnormal) Collected:  06/30/20 1801    Specimen:  Blood Updated:  06/30/20 1831     C-Reactive Protein 3.77 mg/dL     Urine Drug Screen - Urine, Catheter  [[id number]]  (Abnormal) Collected:  06/30/20 1801    Specimen:  Urine, Catheter Updated:  06/30/20 1830     THC, Screen, Urine Negative     Phencyclidine (PCP), Urine Negative     Cocaine Screen, Urine Negative     Methamphetamine, Ur Negative     Opiate Screen Positive     Amphetamine Screen, Urine Negative     Benzodiazepine Screen, Urine Negative     Tricyclic Antidepressants Screen Negative     Methadone Screen, Urine Negative     Barbiturates Screen, Urine Negative     Oxycodone Screen, Urine Negative     Propoxyphene Screen Negative     Buprenorphine, Screen, Urine Negative    Narrative:       Cutoff For Drugs Screened:    Amphetamines               500 ng/ml  Barbiturates               200 ng/ml  Benzodiazepines            150 ng/ml  Cocaine                    150 ng/ml  Methadone                  200 ng/ml  Opiates                    100 ng/ml  Phencyclidine               25 ng/ml  THC                            50 ng/ml  Methamphetamine            500 ng/ml  Tricyclic Antidepressants  300 ng/ml  Oxycodone                  100 ng/ml  Propoxyphene               300 ng/ml  Buprenorphine               10 ng/ml    The normal value for all drugs tested is negative. This report includes unconfirmed screening results, with the cutoff values listed, to be used for medical treatment purposes only.  Unconfirmed results must not be used for non-medical purposes such as employment or legal testing.  Clinical consideration should be applied to any drug of abuse test, particularly when unconfirmed results are used.      Lactic Acid, Plasma [[id number]]  (Normal) Collected:  06/30/20 1801    Specimen:  Blood Updated:  06/30/20 1829     Lactate 1.9 mmol/L     BNP [[id number]]  (Normal) Collected:  06/30/20 1801    Specimen:  Blood Updated:  06/30/20 1829     proBNP 1,107.0 pg/mL     Narrative:       Among patients with dyspnea, NT-proBNP is highly sensitive for the detection of acute congestive heart failure. In addition  NT-proBNP of <300 pg/ml effectively rules out acute congestive heart failure with 99% negative predictive value.    Results may be falsely decreased if patient taking Biotin.      Urinalysis With Culture If Indicated - Urine, Catheter In/Out [[id number]]  (Abnormal) Collected:  06/30/20 1801    Specimen:  Urine, Catheter In/Out Updated:  06/30/20 1820     Color, UA Yellow     Appearance, UA Clear     pH, UA 7.5     Specific Gravity, UA 1.009     Glucose, UA Negative     Ketones, UA Trace     Bilirubin, UA Negative     Blood, UA Small (1+)     Protein, UA Negative     Leuk Esterase, UA Negative     Nitrite, UA Negative     Urobilinogen, UA 0.2 E.U./dL    Urinalysis, Microscopic Only - Urine, Catheter In/Out [[id number]]  (Abnormal) Collected:  06/30/20 1801    Specimen:  Urine, Catheter In/Out Updated:  06/30/20 1820     RBC, UA 0-2 /HPF      WBC, UA 0-2 /HPF      Bacteria, UA None Seen /HPF      Squamous Epithelial Cells, UA None Seen /HPF      Hyaline Casts, UA None Seen /LPF      Methodology Automated Microscopy    CBC & Differential [[id number]] Collected:  06/30/20 1801    Specimen:  Blood Updated:  06/30/20 1810    Narrative:       The following orders were created for panel order CBC & Differential.  Procedure                               Abnormality         Status                     ---------                               -----------         ------                     CBC Auto Differential[[id number]]        Abnormal            Final result                 Please view results for these tests on the individual orders.    CBC Auto Differential [[id number]]  (Abnormal) Collected:  06/30/20 1801    Specimen:  Blood Updated:  06/30/20 1810     WBC 11.71 10*3/mm3      RBC 3.98 10*6/mm3      Hemoglobin 11.5 g/dL      Hematocrit 35.7 %      MCV 89.7 fL      MCH 28.9 pg      MCHC 32.2 g/dL      RDW 14.5 %      RDW-SD 47.5 fl      MPV 11.0 fL      Platelets 309 10*3/mm3      Neutrophil % 79.0 %      Lymphocyte % 12.7 %       Monocyte % 7.3 %      Eosinophil % 0.2 %      Basophil % 0.5 %      Immature Grans % 0.3 %      Neutrophils, Absolute 9.26 10*3/mm3      Lymphocytes, Absolute 1.49 10*3/mm3      Monocytes, Absolute 0.85 10*3/mm3      Eosinophils, Absolute 0.02 10*3/mm3      Basophils, Absolute 0.06 10*3/mm3      Immature Grans, Absolute 0.03 10*3/mm3      nRBC 0.0 /100 WBC     Blood Culture - Blood, Arm, Right [[id number]] Collected:  06/30/20 1802    Specimen:  Blood from Arm, Right Updated:  06/30/20 1808        Imaging Results (Last 24 Hours)     Procedure Component Value Units Date/Time    CT Head Without Contrast [[id number]] Collected:  06/30/20 2043     Updated:  06/30/20 2121    Narrative:         CT head without contrast on 6/30/2020     CLINICAL INDICATION: Altered mental status    TECHNIQUE:  Multiple axial images are obtained throughout the  head without the administration of contrast.  This exam was  performed according to our departmental dose-optimization  program, which includes automated exposure control, adjustment of  the mA and/or kV according to patient size and/or use of  iterative reconstruction technique.  Total DLP is 908.2 mGy*cm.    COMPARISON: CT from 6/1/2014 and MRI from 6/2/2014    FINDINGS:  There is generalized cerebral atrophy. There is low  density in the periventricular white matter consistent with  chronic small vessel ischemic changes. There is no hydrocephalus.  There is no hemorrhage. There are no abnormal extra-axial fluid  collections. There is no mass, mass effect or midline shift.  There is no CT evidence of acute infarct. No bony abnormality is  noted.      Impression:       Atrophy and chronic small vessel ischemic changes  with no acute intracranial abnormality.    Electronically signed by:  Edmundo Infante  6/30/2020 9:20 PM  CDT Workstation: [id number]    XR Chest 1 View [[id number]] Collected:  06/30/20 1853     Updated:  06/30/20 1938    Narrative:       PROCEDURE: XR CHEST 1  VW    VIEWS:Single    INDICATION: Altered mental status    COMPARISON: CXR: 6/1/2014    FINDINGS:       - lines/tubes: None    - cardiac: Size within normal limits.    - mediastinum: Contour within normal limits.     - lungs: There is increased interstitial prominence, and an  area of mild patchy airspace opacification the left perihilar  lung zone.     - pleura: No evidence of  fluid.      - osseous: Unremarkable for age.      Impression:       Residual airspace opacifications as above, may represent mild  asymmetric edema or pneumonia. Clinical correlation needed.      Electronically signed by:  Christen Diaz MD  6/30/2020 7:37 PM CDT  Workstation: [id number]            Assessment:    Active Hospital Problems    Diagnosis   • Transient alteration of awareness     Impression    1.  Post accidental overdose  -Post Narcan  -Question aspiration    2.  Community-acquired pneumonia cannot rule out viral  - Added Rocephin for coverage with question pneumonitis  -Await COVID-19 testing  -No evidence of sepsis syndrome  -However requiring O2  - COVID positive trend d-dimer    3.  Diabetes mellitus  -Continue outpatient medications note A1c most recently was less than 7  -Add sliding scale    4.  Hypertension  -Continue current medications no hypertension currently her sepsis syndrome    5.  Chronic pain  -Continue outpatient medications    CODE STATUS patient currently a full code    Prophylaxis  -GI not needed  -VT will add subcu Lovenox    Discussed with ED nursing as well as ED physician admitted to hospital with greater than 48-hour stay likely secondary to hypoxemia presentation.  Await COVID testing        Marco Antonio Batres MD

## 2020-07-01 NOTE — PROGRESS NOTES
[facility] Medicine Services  INPATIENT PROGRESS NOTE    Length of Stay: 0  Date of Admission: 6/30/2020  Primary Care Physician: Zelalem Diaz MD    Subjective   Chief Complaint: AMS  HPI:  79 year old male with a history of DMII, dementia, and chronic narcotic use who presented with altered mental status.  This improved with narcan.  He was found to be hypoxic.  COVID-19 is negative.  Chest x-ray revealed mild patchy airspace opacification the left perihilar lung zone.  He was admitted and initiated on IV antibiotics.     Review of Systems   Unable to perform ROS: Dementia        All pertinent negatives and positives are as above. All other systems have been reviewed and are negative unless otherwise stated.     Objective    Temp:  [97.4 °F (36.3 °C)-100.4 °F (38 °C)] 98.8 °F (37.1 °C)  Heart Rate:  [] 69  Resp:  [18-20] 18  BP: (107-164)/() 138/70    Physical Exam   Constitutional: He appears well-developed and well-nourished.   HENT:   Head: Normocephalic and atraumatic.   Eyes: Conjunctivae are normal.   Cardiovascular: Normal rate, regular rhythm and intact distal pulses.   Pulmonary/Chest: Effort normal and breath sounds normal. No respiratory distress.   Abdominal: Soft. Bowel sounds are normal. He exhibits no distension.   Musculoskeletal: He exhibits no edema or deformity.   Neurological: He is alert.   Skin: Skin is warm and dry. He is not diaphoretic.   Vitals reviewed.      Results Review:  I have reviewed the labs, radiology results, and diagnostic studies.    Laboratory Data:   Results from last 7 days   Lab Units 07/01/20  0545 06/30/20  1801   SODIUM mmol/L 139 140   POTASSIUM mmol/L 3.9 4.0   CHLORIDE mmol/L 105 102   CO2 mmol/L 23.0 22.0   BUN mg/dL 20 20   CREATININE mg/dL 1.00 1.24   GLUCOSE mg/dL 113* 143*   CALCIUM mg/dL 9.4 10.0   BILIRUBIN mg/dL  --  0.3   ALK PHOS U/L  --  68   ALT (SGPT) U/L  --  6   AST (SGOT) U/L  --  17   ANION  GAP mmol/L 11.0 16.0*     Estimated Creatinine Clearance: 54.3 mL/min (by C-G formula based on SCr of 1 mg/dL).  Results from last 7 days   Lab Units 07/01/20  0545 06/30/20  1801   MAGNESIUM mg/dL  --  1.5*   PHOSPHORUS mg/dL 2.7  --          Results from last 7 days   Lab Units 07/01/20  0545 06/30/20  1801   WBC 10*3/mm3 7.59 11.71*   HEMOGLOBIN g/dL 9.9* 11.5*   HEMATOCRIT % 30.0* 35.7*   PLATELETS 10*3/mm3 243 309           Culture Data:   No results found for: BLOODCX  No results found for: URINECX  No results found for: RESPCX  No results found for: WOUNDCX  No results found for: STOOLCX  No components found for: BODYFLD    Radiology Data:   Imaging Results (Last 24 Hours)     Procedure Component Value Units Date/Time    CT Head Without Contrast [[id number]] Collected:  06/30/20 2043     Updated:  06/30/20 2121    Narrative:         CT head without contrast on 6/30/2020     CLINICAL INDICATION: Altered mental status    TECHNIQUE:  Multiple axial images are obtained throughout the  head without the administration of contrast.  This exam was  performed according to our departmental dose-optimization  program, which includes automated exposure control, adjustment of  the mA and/or kV according to patient size and/or use of  iterative reconstruction technique.  Total DLP is 908.2 mGy*cm.    COMPARISON: CT from 6/1/2014 and MRI from 6/2/2014    FINDINGS:  There is generalized cerebral atrophy. There is low  density in the periventricular white matter consistent with  chronic small vessel ischemic changes. There is no hydrocephalus.  There is no hemorrhage. There are no abnormal extra-axial fluid  collections. There is no mass, mass effect or midline shift.  There is no CT evidence of acute infarct. No bony abnormality is  noted.      Impression:       Atrophy and chronic small vessel ischemic changes  with no acute intracranial abnormality.    Electronically signed by:  Edmundo Infante  6/30/2020 9:20 PM  CDT  Workstation: [id number]    XR Chest 1 View [[id number]] Collected:  06/30/20 1853     Updated:  06/30/20 1938    Narrative:       PROCEDURE: XR CHEST 1 VW    VIEWS:Single    INDICATION: Altered mental status    COMPARISON: CXR: 6/1/2014    FINDINGS:       - lines/tubes: None    - cardiac: Size within normal limits.    - mediastinum: Contour within normal limits.     - lungs: There is increased interstitial prominence, and an  area of mild patchy airspace opacification the left perihilar  lung zone.     - pleura: No evidence of  fluid.      - osseous: Unremarkable for age.      Impression:       Residual airspace opacifications as above, may represent mild  asymmetric edema or pneumonia. Clinical correlation needed.      Electronically signed by:  Christen Diaz MD  6/30/2020 7:37 PM CDT  Workstation: [id number]          I have reviewed the patient's current medications.     Assessment/Plan     Active Hospital Problems    Diagnosis   • **Transient alteration of awareness   • CAP (community acquired pneumonia)   • Narcotic overdose (CMS/HCC)     accidental     • Type 2 diabetes mellitus (CMS/HCC)   • Essential hypertension       Plan:    Rocephin/azithromycin  Follow cultures  Glucose control: SSI  PT/OT evaluation  VTE PPx: lovenox      The patient was evaluated during the global COVID-19 pandemic, and the diagnosis was suspected/considered upon their initial presentation.  Evaluation, treatment, and testing were consistent with current guidelines for patients who present with complaints or symptoms that may be related to COVID-19.        This document has been electronically signed by SANDIP Tilley on July 1, 2020 13:01

## 2020-07-01 NOTE — PLAN OF CARE
Problem: Patient Care Overview  Goal: Plan of Care Review  Outcome: Ongoing (interventions implemented as appropriate)  Flowsheets (Taken 7/1/2020 2464)  Plan of Care Reviewed With: patient  Outcome Summary: OT Eval completed on this date. Pt lethargic, but pleasant and agreeable to therapy. Bed Mobility: CGA for Sup<>Sit and Independent with Sit<>Sup Transfers: CGA with use of straight cane Grooming: Supervision with set-up for washing face Feeding: Independent for bringing liquids to mouth Funct Mob: CGA with use of straight cane for bedroom mobility LBD: Supervision for don/doff socks while sitting EOB. Pt demos decreased balance, decreased strength, decreased activity tolerance, and decreased safety awareness. Pt would benefit from continued skilled OT to address functional deficits. Recommend d/c home with HHOT.

## 2020-07-01 NOTE — PROGRESS NOTES
Adult Nutrition  Assessment    Patient Name:  Eduardo De Leon  YOB: 1940  MRN: [id number]  Admit Date:  6/30/2020    Assessment Date:  7/1/2020    Comments:  80yo male admit w/ AMS- MD baez possibly r/t accidental OD w/ +opiates on admit- a/p narcan with mild improvement in mental status. COVID 19 negative. PMH includes DM w/ metabolic syndrome, CKD stg 1. Labs and meds noted. ADA diet, no intakes available. Noted conflicting wts- 6/30 162# and today 136#/ BMI 18.5--unsure of accuracy and no wt hx in Epic. Pt asleep when RD visited, no family at bedside. RD to follow hospital course, including wt trend, intake.     Reason for Assessment     Row Name 07/01/20 1213          Reason for Assessment    Reason For Assessment  identified at risk by screening criteria     Diagnosis  neurologic conditions     Identified At Risk by Screening Criteria  BMI         Nutrition/Diet History     Row Name 07/01/20 1213          Nutrition/Diet History    Typical Food/Fluid Intake  Pt asleep, no family at bedside.          Anthropometrics     Row Name 07/01/20 0527          Anthropometrics    Weight  62.1 kg (136 lb 14.4 oz)         Labs/Tests/Procedures/Meds     Row Name 07/01/20 1214          Labs/Procedures/Meds    Lab Results Reviewed  reviewed     Lab Results Comments  Glu 143/113/107, Alb 4.4, elev CRP and low Mg        Diagnostic Tests/Procedures    Diagnostic Test/Procedure Reviewed  reviewed     Diagnostic Test/Procedures Comments  CT head, CXR, COVID 19 negative        Medications    Pertinent Medications Reviewed  reviewed     Pertinent Medications Comments  SSI, tricor           Estimated/Assessed Needs     Row Name 07/01/20 1215          Calculation Measurements    Weight Used For Calculations  80.7 kg (178 lb) IBW        Estimated/Assessed Needs    Additional Documentation  Fluid Requirements (Group);Protein Requirements (Group);Calorie Requirements (Group);KCAL/KG (Group)        Calorie Requirements     Estimated Calorie Requirement (kcal/day)  2000        KCAL/KG    KCAL/KG  25 Kcal/Kg (kcal)     25 Kcal/Kg (kcal)  2018.5        Protein Requirements    Weight Used For Protein Calculations  80.7 kg (178 lb)     Est Protein Requirement Amount (gms/kg)  0.8 gm protein     Estimated Protein Requirements (gms/day)  64.59        Fluid Requirements    Estimated Fluid Requirements (mL/day)  1800     RDA Method (mL)  1800         Nutrition Prescription Ordered     Row Name 07/01/20 1215          Nutrition Prescription PO    Current PO Diet  Regular     Common Modifiers  Consistent Carbohydrate         Evaluation of Received Nutrient/Fluid Intake     Row Name 07/01/20 1215          PO Evaluation    Number of Days PO Intake Evaluated  Insufficient Data               Electronically signed by:  Zenaida Guerrero RD  07/01/20 12:17

## 2020-07-01 NOTE — PLAN OF CARE
Problem: Patient Care Overview  Goal: Plan of Care Review  Outcome: Ongoing (interventions implemented as appropriate)  Flowsheets (Taken 7/1/2020 1405)  Progress: improving  Plan of Care Reviewed With: patient  Outcome Summary: Vss, denies pain, resting between care, will cont to monitor.

## 2020-07-01 NOTE — PLAN OF CARE
Problem: Patient Care Overview  Goal: Plan of Care Review  Outcome: Ongoing (interventions implemented as appropriate)  Flowsheets  Taken 7/1/2020 0123  Progress: improving  Outcome Summary: VSS, he is admitted, sleeping well, very difficult ambulating X2 assist, voids frequently and has urgency, will cont to monitor  Taken 6/30/2020 2223  Plan of Care Reviewed With: patient

## 2020-07-01 NOTE — PLAN OF CARE
Problem: Patient Care Overview  Goal: Plan of Care Review  Outcome: Ongoing (interventions implemented as appropriate)  Note:    ADA diet, no intakes available. Noted conflicting wts- 6/30 162# and today 136#/ BMI 18.5--unsure of accuracy and no wt hx in Epic. RD following.

## 2020-07-01 NOTE — ED PROVIDER NOTES
Subjective   Patient presents to our emergency department for altered mental status.  EMS states he was found in the floor and obtunded.  He was given Narcan on the scene and became very agitated and alert.  History of baseline alzheimer's dementia, DM2, CKD, HTN.  Per ex wife he has been in the floor since 11am today and would not get up and refused to go to the hospital.  Takes MS Contin 30mg BID for chronic pain.    Wife states he mumbles a lot at baseline.        History provided by:  Patient   used: No        Review of Systems   Unable to perform ROS: Dementia       Past Medical History:   Diagnosis Date   • Borderline glaucoma     POSSIBLE   • Colon polyp    • Cortical senile cataract    • Diarrhea    • Diarrhea of presumed infectious origin    • History of echocardiogram 06/02/2014    Normal LV systolic function. Grade 1 diastolic dysfunction of the left ventricular myocardium. No evidence of pericardial effusion. No evidence of intracardiac thrombus.   • Inguinal pain    • Nuclear cataract    • Primary open angle glaucoma    • Right inguinal hernia        Allergies   Allergen Reactions   • Iodinated Diagnostic Agents        Past Surgical History:   Procedure Laterality Date   • COLONOSCOPY  05/08/2014    There are a few spotty, minimal areas of erythema in sigmoid colon. Multiple biopsies taken. Fluid aspiration performed. Hemorrhoids found.   • OTHER SURGICAL HISTORY  07/26/2016    EXTENDED VISUAL FIELDS STUDY 53198 (Primary open angle glaucoma)    • OTHER SURGICAL HISTORY  06/21/2016    FUNDUS PHOTOGRAPHY 25587 (Open angle with borderline findings, low risk, unspecified eye)    • OTHER SURGICAL HISTORY  06/21/2016    DISC NFL 98484 (Open angle with borderline findings, low risk, unspecified eye)        Family History   Problem Relation Age of Onset   • Lung cancer Mother    • Diabetes Other    • Heart disease Other    • Hypertension Other        Social History     Socioeconomic History  "  • Marital status:      Spouse name: Not on file   • Number of children: Not on file   • Years of education: Not on file   • Highest education level: Not on file   Tobacco Use   • Smoking status: Current Every Day Smoker   Substance and Sexual Activity   • Alcohol use: No           Objective      /64   Pulse 94   Temp 100.3 °F (37.9 °C) (Rectal)   Resp 20   Ht 182.9 cm (72\")   Wt 73.9 kg (162 lb 14.4 oz)   SpO2 98%   BMI 22.09 kg/m²     Physical Exam   Constitutional: He appears well-developed and well-nourished. He appears distressed.   HENT:   Head: Normocephalic and atraumatic.   Eyes: Conjunctivae are normal.   Cardiovascular: Normal rate, regular rhythm, normal heart sounds and intact distal pulses.   Pulmonary/Chest: Effort normal and breath sounds normal. No respiratory distress. He has no wheezes.   Abdominal: Soft. Bowel sounds are normal. He exhibits no distension and no mass. There is no tenderness. There is no guarding.   Musculoskeletal: He exhibits no edema.   Neurological: He is alert.   Skin: Skin is warm. Capillary refill takes less than 2 seconds.   Psychiatric: He has a normal mood and affect. His behavior is normal. Thought content normal.   Patient flailing arms/leg and mumbling   Nursing note and vitals reviewed.      ECG 12 Lead    Date/Time: 6/30/2020 9:11 PM  Performed by: Jevon Ordonez PA-C  Authorized by: Arnaldo Suarez MD   Interpreted by physician  Comparison: compared with previous ECG from 6/1/2014  Rhythm: sinus rhythm  Rate: tachycardic  BPM: 107  Clinical impression: abnormal ECG  Comments: Nonspecific ST and T wave abnormality                 ED Course  ED Course as of Jun 30 2113   Tue Jun 30, 2020 2033 Patient currently standing comfortably, denying any symptoms, and answering questions appropriately.      [MONTRELL]      ED Course User Index  [MONTRELL] Jevon Ordonez PA-C      Results for orders placed or performed during the hospital encounter " of 06/30/20   Respiratory Panel, PCR - Swab, Nasopharynx   Result Value Ref Range    ADENOVIRUS, PCR Not Detected Not Detected    Coronavirus 229E Not Detected Not Detected    Coronavirus HKU1 Not Detected Not Detected    Coronavirus NL63 Not Detected Not Detected    Coronavirus OC43 Not Detected Not Detected    Human Metapneumovirus Not Detected Not Detected    Human Rhinovirus/Enterovirus Not Detected Not Detected    Influenza B PCR Not Detected Not Detected    Parainfluenza Virus 1 Not Detected Not Detected    Parainfluenza Virus 2 Not Detected Not Detected    Parainfluenza Virus 3 Not Detected Not Detected    Parainfluenza Virus 4 Not Detected Not Detected    Bordetella pertussis pcr Not Detected Not Detected    Influenza A H1 2009 PCR Not Detected Not Detected    Chlamydophila pneumoniae PCR Not Detected Not Detected    Mycoplasma pneumo by PCR Not Detected Not Detected    Influenza A PCR Not Detected Not Detected    Influenza A H3 Not Detected Not Detected    Influenza A H1 Not Detected Not Detected    RSV, PCR Not Detected Not Detected   Comprehensive Metabolic Panel   Result Value Ref Range    Glucose 143 (H) 65 - 99 mg/dL    BUN 20 8 - 23 mg/dL    Creatinine 1.24 0.76 - 1.27 mg/dL    Sodium 140 136 - 145 mmol/L    Potassium 4.0 3.5 - 5.2 mmol/L    Chloride 102 98 - 107 mmol/L    CO2 22.0 22.0 - 29.0 mmol/L    Calcium 10.0 8.6 - 10.5 mg/dL    Total Protein 7.7 6.0 - 8.5 g/dL    Albumin 4.40 3.50 - 5.20 g/dL    ALT (SGPT) 6 1 - 41 U/L    AST (SGOT) 17 1 - 40 U/L    Alkaline Phosphatase 68 39 - 117 U/L    Total Bilirubin 0.3 0.2 - 1.2 mg/dL    eGFR Non African Amer 57 (L) >60 mL/min/1.73    Globulin 3.3 gm/dL    A/G Ratio 1.3 g/dL    BUN/Creatinine Ratio 16.1 7.0 - 25.0    Anion Gap 16.0 (H) 5.0 - 15.0 mmol/L   Urinalysis With Culture If Indicated - Urine, Catheter In/Out   Result Value Ref Range    Color, UA Yellow Yellow, Straw, Dark Yellow, Kacy    Appearance, UA Clear Clear    pH, UA 7.5 5.0 - 9.0     Specific Gravity, UA 1.009 1.003 - 1.030    Glucose, UA Negative Negative    Ketones, UA Trace (A) Negative    Bilirubin, UA Negative Negative    Blood, UA Small (1+) (A) Negative    Protein, UA Negative Negative    Leuk Esterase, UA Negative Negative    Nitrite, UA Negative Negative    Urobilinogen, UA 0.2 E.U./dL 0.2 - 1.0 E.U./dL   CBC Auto Differential   Result Value Ref Range    WBC 11.71 (H) 3.40 - 10.80 10*3/mm3    RBC 3.98 (L) 4.14 - 5.80 10*6/mm3    Hemoglobin 11.5 (L) 13.0 - 17.7 g/dL    Hematocrit 35.7 (L) 37.5 - 51.0 %    MCV 89.7 79.0 - 97.0 fL    MCH 28.9 26.6 - 33.0 pg    MCHC 32.2 31.5 - 35.7 g/dL    RDW 14.5 12.3 - 15.4 %    RDW-SD 47.5 37.0 - 54.0 fl    MPV 11.0 6.0 - 12.0 fL    Platelets 309 140 - 450 10*3/mm3    Neutrophil % 79.0 (H) 42.7 - 76.0 %    Lymphocyte % 12.7 (L) 19.6 - 45.3 %    Monocyte % 7.3 5.0 - 12.0 %    Eosinophil % 0.2 (L) 0.3 - 6.2 %    Basophil % 0.5 0.0 - 1.5 %    Immature Grans % 0.3 0.0 - 0.5 %    Neutrophils, Absolute 9.26 (H) 1.70 - 7.00 10*3/mm3    Lymphocytes, Absolute 1.49 0.70 - 3.10 10*3/mm3    Monocytes, Absolute 0.85 0.10 - 0.90 10*3/mm3    Eosinophils, Absolute 0.02 0.00 - 0.40 10*3/mm3    Basophils, Absolute 0.06 0.00 - 0.20 10*3/mm3    Immature Grans, Absolute 0.03 0.00 - 0.05 10*3/mm3    nRBC 0.0 0.0 - 0.2 /100 WBC   C-reactive Protein   Result Value Ref Range    C-Reactive Protein 3.77 (H) 0.00 - 0.50 mg/dL   Procalcitonin   Result Value Ref Range    Procalcitonin 0.14 0.10 - 0.25 ng/mL   Lactate Dehydrogenase   Result Value Ref Range     135 - 225 U/L   Troponin   Result Value Ref Range    Troponin T 0.013 0.000 - 0.030 ng/mL   BNP   Result Value Ref Range    proBNP 1,107.0 5.0-1,800.0 pg/mL   Lipase   Result Value Ref Range    Lipase 45 13 - 60 U/L   CK   Result Value Ref Range    Creatine Kinase 349 (H) 20 - 200 U/L   Ethanol   Result Value Ref Range    Ethanol <10 0 - 10 mg/dL    Ethanol % <0.010 %   Urine Drug Screen - Urine, Catheter   Result Value  Ref Range    THC, Screen, Urine Negative Negative    Phencyclidine (PCP), Urine Negative Negative    Cocaine Screen, Urine Negative Negative    Methamphetamine, Ur Negative Negative    Opiate Screen Positive (A) Negative    Amphetamine Screen, Urine Negative Negative    Benzodiazepine Screen, Urine Negative Negative    Tricyclic Antidepressants Screen Negative Negative    Methadone Screen, Urine Negative Negative    Barbiturates Screen, Urine Negative Negative    Oxycodone Screen, Urine Negative Negative    Propoxyphene Screen Negative Negative    Buprenorphine, Screen, Urine Negative Negative   Lactic Acid, Plasma   Result Value Ref Range    Lactate 1.9 0.5 - 2.0 mmol/L   Magnesium   Result Value Ref Range    Magnesium 1.5 (L) 1.6 - 2.4 mg/dL   Urinalysis, Microscopic Only - Urine, Catheter In/Out   Result Value Ref Range    RBC, UA 0-2 (A) None Seen /HPF    WBC, UA 0-2 None Seen, 0-2, 3-5 /HPF    Bacteria, UA None Seen None Seen /HPF    Squamous Epithelial Cells, UA None Seen None Seen, 0-2 /HPF    Hyaline Casts, UA None Seen None Seen /LPF    Methodology Automated Microscopy    Light Blue Top   Result Value Ref Range    Extra Tube hold for add-on    Green Top (Gel)   Result Value Ref Range    Extra Tube Hold for add-ons.    Lavender Top   Result Value Ref Range    Extra Tube hold for add-on    Gold Top - SST   Result Value Ref Range    Extra Tube Hold for add-ons.      Xr Chest 1 View    Result Date: 6/30/2020  Narrative: PROCEDURE: XR CHEST 1 VW VIEWS:Single INDICATION: Altered mental status COMPARISON: CXR: 6/1/2014 FINDINGS:   - lines/tubes: None   - cardiac: Size within normal limits.   - mediastinum: Contour within normal limits.   - lungs: There is increased interstitial prominence, and an area of mild patchy airspace opacification the left perihilar lung zone.   - pleura: No evidence of  fluid.    - osseous: Unremarkable for age.     Impression: Residual airspace opacifications as above, may represent mild  asymmetric edema or pneumonia. Clinical correlation needed. Electronically signed by:  Christen Diaz MD  6/30/2020 7:37 PM CDT Workstation: [id number]         Patient admitted to hospitalist.                                [facility]    Final diagnoses:   Transient alteration of awareness   Hypomagnesemia   Opacity of lung on imaging study            Jevon Ordonez PA-C  06/30/20 2095

## 2020-07-01 NOTE — THERAPY EVALUATION
Acute Care - Occupational Therapy Initial Evaluation  [facility]     Patient Name: Eduardo De Leon  : 1940  MRN: [id number]  Today's Date: 2020  Onset of Illness/Injury or Date of Surgery: 20  Date of Referral to OT: 20       Admit Date: 2020       ICD-10-CM ICD-9-CM   1. Transient alteration of awareness R40.4 780.02   2. Hypomagnesemia E83.42 275.2   3. Opacity of lung on imaging study R91.8 793.19   4. 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 Grade 1 diastolic dysfunction of the left ventricular myocardium. No evidence of pericardial effusion. No evidence of intracardiac thrombus.   • Hyperlipidemia    • Hypertension    • Inguinal pain    • Nuclear cataract    • Primary open angle glaucoma    • Right inguinal hernia      Past Surgical History:   Procedure Laterality Date   • COLONOSCOPY  2014    There are a few spotty, minimal areas of erythema in sigmoid colon. Multiple biopsies taken. Fluid aspiration performed. Hemorrhoids found.   • OTHER SURGICAL HISTORY  2016    EXTENDED VISUAL FIELDS STUDY 71829 (Primary open angle glaucoma)    • OTHER SURGICAL HISTORY  2016    FUNDUS  PHOTOGRAPHY 24885 (Open angle with borderline findings, low risk, unspecified eye)    • OTHER SURGICAL HISTORY  06/21/2016    DISC NFL 08671 (Open angle with borderline findings, low risk, unspecified eye)           OT ASSESSMENT FLOWSHEET (last 12 hours)      Occupational Therapy Evaluation     Row Name 07/01/20 1435                   OT Evaluation Time/Intention    Subjective Information  no complaints  -        Document Type  evaluation  -        Mode of Treatment  individual therapy;occupational therapy  -        Patient Effort  good  -           General Information    Patient Profile Reviewed?  yes  -        Onset of Illness/Injury or Date of Surgery  07/01/20  -        Patient Observations  alert;cooperative;agree to therapy  -        Patient/Family Observations  None present  -        General Observations of Patient  Supine in bed  -        Prior Level of Function  independent:;all household mobility;gait;transfer;ADL's  -        Equipment Currently Used at Home  cane, straight  -        Existing Precautions/Restrictions  fall  -        Risks Reviewed  patient:;LOB;nausea/vomiting;dizziness;increased discomfort;change in vital signs;increased drainage;lines disloged  -        Benefits Reviewed  patient:;improve function;increase independence;increase strength;decrease pain;decrease risk of DVT;improve skin integrity;increase knowledge;increase balance  -           Relationship/Environment    Primary Source of Support/Comfort  other (see comments) EX Wife  -        Lives With  other (see comments) Ex Wife  -           Resource/Environmental Concerns    Current Living Arrangements  home/apartment/condo  -        Resource/Environmental Concerns  none  -           Home Main Entrance    Number of Stairs, Main Entrance  three  -        Stair Railings, Main Entrance  none  [facility]           Cognitive Assessment/Interventions    Additional Documentation  Cognitive  Assessment/Intervention (Group)  [facility]           Cognitive Assessment/Intervention- PT/OT    Affect/Mental Status (Cognitive)  WFL;low arousal/lethargic  -        Orientation Status (Cognition)  oriented to;person;place;disoriented to;situation;time  -        Follows Commands (Cognition)  WFL  -        Cognitive Function (Cognitive)  memory deficit  -        Memory Deficit (Cognitive)  mild deficit  -        Personal Safety Interventions  fall prevention program maintained;gait belt;muscle strengthening facilitated;nonskid shoes/slippers when out of bed;supervised activity  -           Safety Issues, Functional Mobility    Safety Issues Affecting Function (Mobility)  awareness of need for assistance;insight into deficits/self awareness;impulsivity  -        Impairments Affecting Function (Mobility)  balance;endurance/activity tolerance;shortness of breath;strength  -           Bed Mobility Assessment/Treatment    Bed Mobility Assessment/Treatment  supine-sit;sit-supine  -        Supine-Sit [place] (Bed Mobility)  conditional independence  -        Sit-Supine [place] (Bed Mobility)  independent  -        Bed Mobility, Safety Issues  impaired trunk control for bed mobility  -           Transfer Assessment/Treatment    Transfer Assessment/Treatment  sit-stand transfer;toilet transfer  -           Sit-Stand Transfer    Sit-Stand [place] (Transfers)  contact guard  [facility]        Assistive Device (Sit-Stand Transfers)  cane, straight  -           Toilet Transfer    Type (Toilet Transfer)  sit-stand;stand-sit  -        [place] Level (Toilet Transfer)  contact guard  [facility]        Assistive Device (Toilet Transfer)  cane, straight  [facility]           ADL Assessment/Intervention    BADL Assessment/Intervention  lower body dressing;grooming;feeding  -           Lower Body Dressing Assessment/Training    Lower Body Dressing [place] Level  doff;don;socks;supervision  -JH        Lower  Body Dressing Position  edge of bed sitting  -           Grooming Assessment/Training    [place] Level (Grooming)  wash face, hands;supervision;set up  -        Grooming Position  edge of bed sitting  -           Self-Feeding Assessment/Training    [place] Level (Feeding)  liquids to mouth;independent  -        Position (Self-Feeding)  edge of bed sitting  -           BADL Safety/Performance    Impairments, BADL Safety/Performance  balance;endurance/activity tolerance;trunk/postural control;strength  -           General ROM    GENERAL ROM COMMENTS  BUE ROM WFL  -           MMT (Manual Muscle Testing)    General MMT Comments  BUE MMT Strength 4/5 Grossly  -           Sensory Assessment/Intervention    Sensory General Assessment  no sensation deficits identified  -           Positioning and Restraints    Pre-Treatment Position  in bed  -        Post Treatment Position  bed  -        In Bed  supine;call light within reach;encouraged to call for assist;exit alarm on;side rails up x2;notified [facility]           Pain Assessment    Additional Documentation  Pain Scale: Numbers Pre/Post-Treatment (Group)  [facility]           Pain Scale: Numbers Pre/Post-Treatment    Pain Scale: Numbers, Pretreatment  0/10 - no pain  -        Pain Scale: Numbers, Post-Treatment  0/10 - no pain  -           Plan of Care Review    Plan of Care Reviewed With  patient  -           Clinical Impression (OT)    Date of Referral to OT  07/01/20  -        OT Diagnosis  Impaired Mobility and ADL's  -        Criteria for Skilled Therapeutic Interventions Met (OT Eval)  yes;treatment indicated  -        Rehab Potential (OT Eval)  good, to achieve stated therapy goals  -        Therapy Frequency (OT Eval)  other (see comments) 5-7 days per week  -        Predicted Duration of Therapy Intervention (Therapy Eval)  Until goals met or d/c  -        Care Plan Review (OT)  evaluation/treatment results reviewed;care  plan/treatment goals reviewed;risks/benefits reviewed;current/potential barriers reviewed;patient/other agree to care plan  -        Anticipated Discharge Disposition (OT)  home with home health  -           Vital Signs    Pretreatment Heart Rate (beats/min)  67  -        Intratreatment Heart Rate (beats/min)  81  -JH        Posttreatment Heart Rate (beats/min)  65  -        Pre SpO2 (%)  98  -JH        O2 Delivery Pre Treatment  nasal cannula  -        Intra SpO2 (%)  96  -        O2 Delivery Intra Treatment  room air  -        Post SpO2 (%)  94  -        O2 Delivery Post Treatment  nasal cannula  -        Pre Patient Position  Supine  -        Intra Patient Position  Sitting  -        Post Patient Position  Supine  -           Planned OT Interventions    Planned Therapy Interventions (OT Eval)  activity tolerance training;adaptive equipment training;BADL retraining;transfer/mobility retraining;strengthening exercise;ROM/therapeutic exercise;patient/caregiver education/training;occupation/activity based interventions;neuromuscular control/coordination retraining;functional balance retraining  -           OT Goals    Bathing Goal Selection (OT)  bathing, OT goal 1  -        Dressing Goal Selection (OT)  dressing, OT goal 1  -        Toileting Goal Selection (OT)  toileting, OT goal 1  -        Strength Goal Selection (OT)  strength, OT goal 1  -        Activity Tolerance Goal Selection (OT)  activity tolerance, OT goal 1  -        Additional Documentation  Activity Tolerance Goal Selection (OT) (Row);Strength Goal Selection (OT) (Row)  -           Bathing Goal 1 (OT)    Activity/Assistive Device (Bathing Goal 1, OT)  lower body bathing  -        [place] Level/Cues Needed (Bathing Goal 1, OT)  standby assist;verbal cues required;tactile cues required;set-up required  -        Time Frame (Bathing Goal 1, OT)  long term goal (LTG);by discharge  -        Progress/Outcomes  (Bathing Goal 1, OT)  goal not met  -JH           Dressing Goal 1 (OT)    Activity/Assistive Device (Dressing Goal 1, OT)  lower body dressing  -JH        [place]/Cues Needed (Dressing Goal 1, OT)  standby assist;verbal cues required;tactile cues required;set-up required  -JH        Time Frame (Dressing Goal 1, OT)  long term goal (LTG);by discharge  -        Progress/Outcome (Dressing Goal 1, OT)  goal not met  -JH           Toileting Goal 1 (OT)    Activity/Device (Toileting Goal 1, OT)  toileting skills, all  -JH        [place] Level/Cues Needed (Toileting Goal 1, OT)  standby assist;verbal cues required;tactile cues required;set-up required  -JH        Time Frame (Toileting Goal 1, OT)  long term goal (LTG);by discharge  -        Progress/Outcome (Toileting Goal 1, OT)  goal not met  -JH           Strength Goal 1 (OT)    Strength Goal 1 (OT)  Pt to demo compliance/understanding of HEP to improve strength  -JH        Time Frame (Strength Goal 1, OT)  long term goal (LTG);by discharge  -        Progress/Outcome (Strength Goal 1, OT)  goal not met  -            Activity Tolerance Goal 1 (OT)    Activity Level (Endurance Goal 1, OT)  -- 15 min functional activity with proper EC  -JH        Time Frame (Activity Tolerance Goal 1, OT)  long term goal (LTG);by discharge  -        Progress/Outcome (Activity Tolerance Goal 1, OT)  goal not met  -JH           Living Environment    Home Accessibility  stairs to enter home  -          User Key  (r) = Recorded By, (t) = Taken By, (c) = Cosigned By    Initials Name Effective Dates    Frederick Randhawa OT 09/10/19 -          Occupational Therapy Education                 Title: PT OT SLP Therapies (In Progress)     Topic: Occupational Therapy (In Progress)     Point: ADL training (Not Started)     Description:   Instruct learner(s) on proper safety adaptation and remediation techniques during self care or transfers.   Instruct in proper use of assistive  devices.              Learner Progress:   Not documented in this visit.          Point: Home exercise program (Not Started)     Description:   Instruct learner(s) on appropriate technique for monitoring, assisting and/or progressing therapeutic exercises/activities.              Learner Progress:   Not documented in this visit.          Point: Precautions (Done)     Description:   Instruct learner(s) on prescribed precautions during self-care and functional transfers.              Learning Progress Summary           Patient Acceptance, E, VU by  at 7/1/2020 1440    Comment:  Pt educated on role of OT, d/c recs, and POC                   Point: Body mechanics (Not Started)     Description:   Instruct learner(s) on proper positioning and spine alignment during self-care, functional mobility activities and/or exercises.              Learner Progress:   Not documented in this visit.                      User Key     Initials Effective Dates Name Provider Type Discipline     09/10/19 -  Frederick Montiel OT Occupational Therapist OT                  OT Recommendation and Plan  Outcome Summary/Treatment Plan (OT)  Anticipated Discharge Disposition (OT): home with home health  Planned Therapy Interventions (OT Eval): activity tolerance training, adaptive equipment training, BADL retraining, transfer/mobility retraining, strengthening exercise, ROM/therapeutic exercise, patient/caregiver education/training, occupation/activity based interventions, neuromuscular control/coordination retraining, functional balance retraining  Therapy Frequency (OT Eval): other (see comments)(5-7 days per week)  Plan of Care Review  Plan of Care Reviewed With: patient  Plan of Care Reviewed With: patient  Outcome Summary: OT Eval completed on this date. Pt lethargic, but pleasant and agreeable to therapy. Bed Mobility: CGA for Sup<>Sit and Independent with Sit<>Sup Transfers: CGA with use of straight cane Grooming: Supervision with set-up for  washing face Feeding: Independent for bringing liquids to mouth Funct Mob: CGA with use of straight cane for bedroom mobility LBD: Supervision for don/doff socks while sitting EOB. Pt demos decreased balance, decreased strength, decreased activity tolerance, and decreased safety awareness. Pt would benefit from continued skilled OT to address functional deficits. Recommend d/c home with HHOT.    Outcome Measures     Row Name 07/01/20 1435             How much help from another is currently needed...    Putting on and taking off regular lower body clothing?  3  -JH      Bathing (including washing, rinsing, and drying)  3  -JH      Toileting (which includes using toilet bed pan or urinal)  3  -JH      Putting on and taking off regular upper body clothing  3  -      Taking care of personal grooming (such as brushing teeth)  3  -      Eating meals  4  -      AM-PAC 6 Clicks Score (OT)  19  -         Functional Assessment    Outcome Measure Options  AM-PAC 6 Clicks Daily Activity (OT)  -        User Key  (r) = Recorded By, (t) = Taken By, (c) = Cosigned By    Initials Name Provider Type     Frederick Montiel OT Occupational Therapist          Time Calculation:   Time Calculation- OT     Row Name 07/01/20 1547             Time Calculation- OT    OT Start Time  1435  -      OT Stop Time  1503  -      OT Time Calculation (min)  28 min  -      OT Received On  07/01/20  -      OT Goal Re-Cert Due Date  07/14/20  -        User Key  (r) = Recorded By, (t) = Taken By, (c) = Cosigned By    Initials Name Provider Type     Frederick Montiel OT Occupational Therapist        Therapy Charges for Today     Code Description Service Date Service Provider Modifiers Qty    [id number]  OT EVAL MOD COMPLEXITY 2 7/1/2020 Frederick Montiel OT GO 1               Frederick Montiel OT  7/1/2020

## 2020-07-01 NOTE — NURSING NOTE
I spoke to Kami Burden regarding his meds and she reported the last time he had recorded many of the meds was on Sunday. Will cont to monitor

## 2020-07-02 LAB
ALBUMIN SERPL-MCNC: 3.9 G/DL (ref 3.5–5.2)
ALBUMIN/GLOB SERPL: 1.3 G/DL
ALP SERPL-CCNC: 57 U/L (ref 39–117)
ALT SERPL W P-5'-P-CCNC: 9 U/L (ref 1–41)
ANION GAP SERPL CALCULATED.3IONS-SCNC: 13 MMOL/L (ref 5–15)
AST SERPL-CCNC: 26 U/L (ref 1–40)
BASOPHILS # BLD AUTO: 0.04 10*3/MM3 (ref 0–0.2)
BASOPHILS NFR BLD AUTO: 0.6 % (ref 0–1.5)
BILIRUB SERPL-MCNC: 0.2 MG/DL (ref 0.2–1.2)
BILIRUB UR QL STRIP: NEGATIVE
BUN SERPL-MCNC: 19 MG/DL (ref 8–23)
BUN/CREAT SERPL: 19.6 (ref 7–25)
CALCIUM SPEC-SCNC: 9.9 MG/DL (ref 8.6–10.5)
CHLORIDE SERPL-SCNC: 104 MMOL/L (ref 98–107)
CLARITY UR: CLEAR
CO2 SERPL-SCNC: 22 MMOL/L (ref 22–29)
COLOR UR: YELLOW
CREAT SERPL-MCNC: 0.97 MG/DL (ref 0.76–1.27)
DEPRECATED RDW RBC AUTO: 45.6 FL (ref 37–54)
EOSINOPHIL # BLD AUTO: 0.14 10*3/MM3 (ref 0–0.4)
EOSINOPHIL NFR BLD AUTO: 2 % (ref 0.3–6.2)
ERYTHROCYTE [DISTWIDTH] IN BLOOD BY AUTOMATED COUNT: 14 % (ref 12.3–15.4)
GFR SERPL CREATININE-BSD FRML MDRD: 75 ML/MIN/1.73
GLOBULIN UR ELPH-MCNC: 3.1 GM/DL
GLUCOSE BLDC GLUCOMTR-MCNC: 104 MG/DL (ref 70–130)
GLUCOSE BLDC GLUCOMTR-MCNC: 132 MG/DL (ref 70–130)
GLUCOSE BLDC GLUCOMTR-MCNC: 94 MG/DL (ref 70–130)
GLUCOSE BLDC GLUCOMTR-MCNC: 99 MG/DL (ref 70–130)
GLUCOSE BLDC GLUCOMTR-MCNC: 99 MG/DL (ref 70–130)
GLUCOSE SERPL-MCNC: 90 MG/DL (ref 65–99)
GLUCOSE UR STRIP-MCNC: NEGATIVE MG/DL
HCT VFR BLD AUTO: 33.5 % (ref 37.5–51)
HGB BLD-MCNC: 10.9 G/DL (ref 13–17.7)
HGB UR QL STRIP.AUTO: NEGATIVE
IMM GRANULOCYTES # BLD AUTO: 0.02 10*3/MM3 (ref 0–0.05)
IMM GRANULOCYTES NFR BLD AUTO: 0.3 % (ref 0–0.5)
KETONES UR QL STRIP: ABNORMAL
LEUKOCYTE ESTERASE UR QL STRIP.AUTO: NEGATIVE
LYMPHOCYTES # BLD AUTO: 1.92 10*3/MM3 (ref 0.7–3.1)
LYMPHOCYTES NFR BLD AUTO: 27.4 % (ref 19.6–45.3)
MCH RBC QN AUTO: 28.8 PG (ref 26.6–33)
MCHC RBC AUTO-ENTMCNC: 32.5 G/DL (ref 31.5–35.7)
MCV RBC AUTO: 88.6 FL (ref 79–97)
MONOCYTES # BLD AUTO: 0.48 10*3/MM3 (ref 0.1–0.9)
MONOCYTES NFR BLD AUTO: 6.8 % (ref 5–12)
NEUTROPHILS NFR BLD AUTO: 4.42 10*3/MM3 (ref 1.7–7)
NEUTROPHILS NFR BLD AUTO: 62.9 % (ref 42.7–76)
NITRITE UR QL STRIP: NEGATIVE
NRBC BLD AUTO-RTO: 0 /100 WBC (ref 0–0.2)
PH UR STRIP.AUTO: 5.5 [PH] (ref 5–9)
PLATELET # BLD AUTO: 246 10*3/MM3 (ref 140–450)
PMV BLD AUTO: 11.3 FL (ref 6–12)
POTASSIUM SERPL-SCNC: 4.1 MMOL/L (ref 3.5–5.2)
PROT SERPL-MCNC: 7 G/DL (ref 6–8.5)
PROT UR QL STRIP: ABNORMAL
RBC # BLD AUTO: 3.78 10*6/MM3 (ref 4.14–5.8)
SODIUM SERPL-SCNC: 139 MMOL/L (ref 136–145)
SP GR UR STRIP: 1.02 (ref 1–1.03)
UROBILINOGEN UR QL STRIP: ABNORMAL
WBC # BLD AUTO: 7.02 10*3/MM3 (ref 3.4–10.8)

## 2020-07-02 PROCEDURE — 25010000002 CEFTRIAXONE PER 250 MG: Performed by: INTERNAL MEDICINE

## 2020-07-02 PROCEDURE — 82962 GLUCOSE BLOOD TEST: CPT

## 2020-07-02 PROCEDURE — 97162 PT EVAL MOD COMPLEX 30 MIN: CPT

## 2020-07-02 PROCEDURE — 81003 URINALYSIS AUTO W/O SCOPE: CPT | Performed by: NURSE PRACTITIONER

## 2020-07-02 PROCEDURE — 80053 COMPREHEN METABOLIC PANEL: CPT | Performed by: NURSE PRACTITIONER

## 2020-07-02 PROCEDURE — 25010000002 ENOXAPARIN PER 10 MG: Performed by: INTERNAL MEDICINE

## 2020-07-02 PROCEDURE — 85025 COMPLETE CBC W/AUTO DIFF WBC: CPT | Performed by: NURSE PRACTITIONER

## 2020-07-02 RX ADMIN — GABAPENTIN 300 MG: 300 CAPSULE ORAL at 08:10

## 2020-07-02 RX ADMIN — GABAPENTIN 300 MG: 300 CAPSULE ORAL at 20:51

## 2020-07-02 RX ADMIN — FENOFIBRATE 145 MG: 145 TABLET ORAL at 08:10

## 2020-07-02 RX ADMIN — CEFTRIAXONE SODIUM 1 G: 1 INJECTION, POWDER, FOR SOLUTION INTRAMUSCULAR; INTRAVENOUS at 20:52

## 2020-07-02 RX ADMIN — CLOPIDOGREL BISULFATE 75 MG: 75 TABLET ORAL at 08:10

## 2020-07-02 RX ADMIN — SODIUM CHLORIDE, PRESERVATIVE FREE 10 ML: 5 INJECTION INTRAVENOUS at 20:52

## 2020-07-02 RX ADMIN — ATORVASTATIN CALCIUM 20 MG: 20 TABLET, FILM COATED ORAL at 08:10

## 2020-07-02 RX ADMIN — AZITHROMYCIN MONOHYDRATE 500 MG: 250 TABLET ORAL at 13:34

## 2020-07-02 RX ADMIN — ASPIRIN 81 MG: 81 TABLET, COATED ORAL at 08:10

## 2020-07-02 RX ADMIN — SODIUM CHLORIDE, PRESERVATIVE FREE 10 ML: 5 INJECTION INTRAVENOUS at 08:11

## 2020-07-02 RX ADMIN — ENOXAPARIN SODIUM 40 MG: 40 INJECTION SUBCUTANEOUS at 20:52

## 2020-07-02 RX ADMIN — ESCITALOPRAM OXALATE 10 MG: 10 TABLET ORAL at 08:10

## 2020-07-02 NOTE — SIGNIFICANT NOTE
07/02/20 1518   Rehab Treatment   Discipline occupational therapy assistant   Reason Treatment Not Performed patient/family declined treatment

## 2020-07-02 NOTE — PLAN OF CARE
Problem: Patient Care Overview  Goal: Plan of Care Review  Flowsheets (Taken 7/2/2020 6480)  Progress: no change  Plan of Care Reviewed With: patient  Outcome Summary: Vss, denies pain, sleepin gbetween care, will cont to monitor.

## 2020-07-02 NOTE — THERAPY EVALUATION
Patient Name: Eduardo De Leon  : 1940    MRN: [id number]                              Today's Date: 2020       Admit Date: 2020    Visit Dx:     ICD-10-CM ICD-9-CM   1. Transient alteration of awareness R40.4 780.02   2. Hypomagnesemia E83.42 275.2   3. Opacity of lung on imaging study R91.8 793.19   4. Impaired mobility and activities of daily living Z74.09 V49.89    Z78.9    5. 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 Grade 1 diastolic dysfunction of the left ventricular myocardium. No evidence of pericardial effusion. No evidence of intracardiac thrombus.   • Hyperlipidemia    • Hypertension    • Inguinal pain    • Nuclear cataract    • Primary open angle glaucoma    • Right inguinal hernia      Past Surgical History:   Procedure Laterality Date   • COLONOSCOPY  2014    There are a few spotty, minimal areas of erythema in sigmoid colon. Multiple biopsies taken. Fluid aspiration performed. Hemorrhoids found.   • OTHER SURGICAL HISTORY  2016    EXTENDED VISUAL FIELDS STUDY 08130 (Primary open angle glaucoma)    • OTHER SURGICAL HISTORY  2016    FUNDUS PHOTOGRAPHY 11401 (Open angle with borderline findings, low risk,  unspecified eye)    • OTHER SURGICAL HISTORY  06/21/2016    DISC NFL 56204 (Open angle with borderline findings, low risk, unspecified eye)      General Information     Row Name 07/02/20 0955          PT Evaluation Time/Intention    Document Type  evaluation  -LR     Mode of Treatment  individual therapy  -LR     Row Name 07/02/20 0955          General Information    Patient Profile Reviewed?  yes  -LR     Prior Level of Function  independent:;all household mobility;gait;bed mobility;community mobility  -LR     Existing Precautions/Restrictions  fall;oxygen therapy device and L/min  -LR     Row Name 07/02/20 0955          Relationship/Environment    Lives With  -- Ex wife  -LR     Row Name 07/02/20 0955          Resource/Environmental Concerns    Current Living Arrangements  home/apartment/condo  -LR     Row Name 07/02/20 0955          Home Main Entrance    Number of Stairs, Main Entrance  three  -LR     Stair Railings, Main Entrance  none  -LR     Row Name 07/02/20 0955          Cognitive Assessment/Intervention- PT/OT    Orientation Status (Cognition)  oriented to;person;place;disoriented to;situation;time  -LR     Personal Safety Interventions  fall prevention program maintained;gait belt;nonskid shoes/slippers when out of bed  -LR     Row Name 07/02/20 0955          Safety Issues, Functional Mobility    Safety Issues Affecting Function (Mobility)  ability to follow commands;awareness of need for assistance;insight into deficits/self awareness  -LR     Impairments Affecting Function (Mobility)  balance;endurance/activity tolerance;shortness of breath;strength;coordination  -LR       User Key  (r) = Recorded By, (t) = Taken By, (c) = Cosigned By    Initials Name Provider Type    LR Joe Vargas Physical Therapist        Mobility    No documentation.       Obj/Interventions    No documentation.       Goals/Plan     Row Name 07/02/20 1022          Bed Mobility Goal 1 (PT)    Activity/Assistive Device (Bed  Mobility Goal 1, PT)  sit to supine;supine to sit  -LR     [place] Level/Cues Needed (Bed Mobility Goal 1, PT)  independent  -LR     Time Frame (Bed Mobility Goal 1, PT)  by discharge  -LR     Progress/Outcomes (Bed Mobility Goal 1, PT)  goal not met  -LR     Row Name 07/02/20 1022          Transfer Goal 1 (PT)    Activity/Assistive Device (Transfer Goal 1, PT)  sit-to-stand/stand-to-sit;bed-to-chair/chair-to-bed  -LR     [place] Level/Cues Needed (Transfer Goal 1, PT)  conditional independence  -LR     Time Frame (Transfer Goal 1, PT)  by discharge  -LR     Progress/Outcome (Transfer Goal 1, PT)  goal not met  -LR     Row Name 07/02/20 1022          Gait Training Goal 1 (PT)    Activity/Assistive Device (Gait Training Goal 1, PT)  gait (walking locomotion);assistive device use;backward stepping  -LR     [place] Level (Gait Training Goal 1, PT)  conditional independence  -LR     Distance (Gait Goal 1, PT)  150x1  -LR     Time Frame (Gait Training Goal 1, PT)  by discharge  -LR     Progress/Outcome (Gait Training Goal 1, PT)  goal not met  -LR     Row Name 07/02/20 1022          Stairs Goal 1 (PT)    Activity/Assistive Device (Stairs Goal 1, PT)  stairs, all skills;ascending stairs;descending stairs  -LR     [place] Level/Cues Needed (Stairs Goal 1, PT)  conditional independence  -LR     Number of Stairs (Stairs Goal 1, PT)  3  -LR     Progress/Outcome (Stairs Goal 1, PT)  goal not met  -LR     Row Name 07/02/20 1022          Patient Education Goal (PT)    Activity (Patient Education Goal, PT)  Patient will completed Tinetti balance and gait assessment  >24/28 to indicated low fall risk.  -LR     [place]/Cues/Accuracy (Memory Goal 2, PT)  demonstrates adequately  -LR     Time Frame (Patient Education Goal, PT)  by discharge  -LR     Progress/Outcome (Patient Education Goal, PT)  goal not met  -LR       User Key  (r) = Recorded By, (t) = Taken By, (c) = Cosigned By    Initials Name Provider  Type    LR Joe Vargas Physical Therapist        Clinical Impression     Row Name 07/02/20 0955          Pain Scale: Numbers Pre/Post-Treatment    Pain Scale: Numbers, Pretreatment  0/10 - no pain  -LR     Pain Scale: Numbers, Post-Treatment  0/10 - no pain  -LR     Row Name 07/02/20 0955          Plan of Care Review    Plan of Care Reviewed With  patient  -LR     Outcome Summary  PT eval completed on this date. Patient reports quite a few falls in the past 12 months. Bed mobility: SPV for supine>sit transfer with HOB elevated. Transfers: CGA sit<>stand and toliet transfer with Straight cane Gait: Patti for ambulation 10'x2 with Straight cane. Patient had multiple minor LOB with while turning and talking. Balance: Tinetti balance and gait assessment completed 19/28 which indicates high fall risk. Patient would benefit from skilled PT to address issues in functional mobility and balance. Recommend Home with assist and HHPT.  -LR     Row Name 07/02/20 0955          Physical Therapy Clinical Impression    Patient/Family Goals Statement (PT Clinical Impression)  Patient wants to return home  -LR     Criteria for Skilled Interventions Met (PT Clinical Impression)  yes;treatment indicated  -LR     Rehab Potential (PT Clinical Summary)  good, to achieve stated therapy goals  -LR     Predicted Duration of Therapy (PT)  Until d/c or until all goals met  -LR     Row Name 07/02/20 0955          Vital Signs    Pre Systolic BP Rehab  167  -LR     Pre Treatment Diastolic BP  75  -LR     Post Systolic BP Rehab  170  -LR     Post Treatment Diastolic BP  84  -LR     Pretreatment Heart Rate (beats/min)  73  -LR     Intratreatment Heart Rate (beats/min)  81  -LR     Posttreatment Heart Rate (beats/min)  75  -LR     Pre SpO2 (%)  97  -LR     O2 Delivery Pre Treatment  supplemental O2  -LR     Intra SpO2 (%)  95  -LR     Post SpO2 (%)  95  -LR     O2 Delivery Post Treatment  room air  -LR     Pre Patient Position  Sitting  -LR      "Intra Patient Position  Sitting post ambulation  -LR     Post Patient Position  Sitting  -LR     Row Name 07/02/20 0955          Positioning and Restraints    Pre-Treatment Position  in bed  -LR     Post Treatment Position  chair  -LR     In Bed  --  -LR     In Chair  notified nsg;call light within reach;encouraged to call for assist;exit alarm on  -LR       User Key  (r) = Recorded By, (t) = Taken By, (c) = Cosigned By    Initials Name Provider Type    LR Joe Vargas Physical Therapist        Outcome Measures     Row Name 07/02/20 1019          How much help from another person do you currently need...    Turning from your back to your side while in flat bed without using bedrails?  4  -LR     Moving from lying on back to sitting on the side of a flat bed without bedrails?  3  -LR     Moving to and from a bed to a chair (including a wheelchair)?  3  -LR     Standing up from a chair using your arms (e.g., wheelchair, bedside chair)?  3  -LR     Climbing 3-5 steps with a railing?  3  -LR     To walk in hospital room?  3  -LR     AM-PAC 6 Clicks Score (PT)  19  -LR     Row Name 07/02/20 1019          Tinetti Assessment    Sitting Balance  1  -LR     Arises  2  -LR     Attempts to Rise  2  -LR     Immediate Standing Balance (first 5 sec)  2  -LR     Standing Balance  1  -LR     Sternal Nudge (feet close together)  1  -LR     Eyes Closed (feet close together)  0  -LR     Turning 360 Degrees- Steps  0  -LR     Turning 360 Degrees- Steadiness  0  -LR     Sitting Down  1  -LR     Tinetti Balance Score  10  -LR     Gait Initiation (immediate after told \"go\")  1  -LR     Step Length- Right Swing  1  -LR     Step Length- Left Swing  1  -LR     Foot Clearance- Right Foot  1  -LR     Foot Clearance- Left Foot  1  -LR     Step Symmetry  1  -LR     Step Continuity  1  -LR     Path (excursion)  1  -LR     Trunk  0  -LR     Base of Support  1  -LR     Gait Score  9  -LR     Tinetti Total Score  19  -LR     Tinetti Assistive " Device  straight cane  -LR     Row Name 07/02/20 1019          Functional Assessment    Outcome Measure Options  AM-PAC 6 Clicks Basic Mobility (PT);Tinetti  -LR       User Key  (r) = Recorded By, (t) = Taken By, (c) = Cosigned By    Initials Name Provider Type    Joe Tavares Physical Therapist        Physical Therapy Education                 Title: PT OT SLP Therapies (In Progress)     Topic: Physical Therapy (In Progress)     Point: Mobility training (Done)     Description:   Instruct learner(s) on safety and technique for assisting patient out of bed, chair or wheelchair.  Instruct in the proper use of assistive devices, such as walker, crutches, cane or brace.              Patient Friendly Description:   It's important to get you on your feet again, but we need to do so in a way that is safe for you. Falling has serious consequences, and your personal safety is the most important thing of all.        When it's time to get out of bed, one of us or a family member will sit next to you on the bed to give you support.     If your doctor or nurse tells you to use a walker, crutches, a cane, or a brace, be sure you use it every time you get out of bed, even if you think you don't need it.    Learning Progress Summary           Patient Acceptance, E,TB, VU by LR at 7/2/2020 1023    Comment:  PT educated on PT POC, role of PT and expectations. Patient required verbal/tactile cues for hand placement and to increase safety during transfer.                   Point: Home exercise program (Not Started)     Description:   Instruct learner(s) on appropriate technique for monitoring, assisting and/or progressing patient with therapeutic exercises and activities.              Learner Progress:   Not documented in this visit.          Point: Body mechanics (Not Started)     Description:   Instruct learner(s) on proper positioning and spine alignment for patient and/or caregiver during mobility tasks and/or exercises.               Learner Progress:   Not documented in this visit.          Point: Precautions (Not Started)     Description:   Instruct learner(s) on prescribed precautions during mobility and gait tasks              Learner Progress:   Not documented in this visit.                      User Key     Initials Effective Dates Name Provider Type Discipline    LR 06/25/19 -  Joe Vargas Physical Therapist PT              PT Recommendation and Plan  Planned Therapy Interventions (PT Eval): balance training, strengthening, neuromuscular re-education, stretching, orthotic fitting/training, bed mobility training, gait training, home exercise program, patient/family education, transfer training, vestibular therapy, postural re-education, lumbar stabilization, wheelchair management/propulsion training, prosthetic fitting/training, joint mobilization, ROM (range of motion), manual therapy techniques, stair training  Plan of Care Reviewed With: patient  Outcome Summary: PT eval completed on this date. Patient reports quite a few falls in the past 12 months. Bed mobility: SPV for supine>sit transfer with HOB elevated. Transfers: CGA sit<>stand and toliet transfer with Straight cane Gait: Patti for ambulation 10'x2 with Straight cane. Patient had multiple minor LOB with while turning and talking. Balance: Tinetti balance and gait assessment completed 19/28 which indicates high fall risk. Patient would benefit from skilled PT to address issues in functional mobility and balance. Recommend Home with assist and HHPT.     Time Calculation:   PT Charges     Row Name 07/02/20 1025             Time Calculation    Start Time  0955  -LR      Stop Time  1025  -LR      Time Calculation (min)  30 min  -LR      PT Received On  07/02/20  -LR      PT Goal Re-Cert Due Date  07/15/20  -LR         Time Calculation- PT    Total Timed Code Minutes- PT  30 minute(s)  -LR        User Key  (r) = Recorded By, (t) = Taken By, (c) = Cosigned By    Initials  Name Provider Type    LR Joe Vargas Physical Therapist        Therapy Charges for Today     Code Description Service Date Service Provider Modifiers Qty    [id number] HC PT EVAL MOD COMPLEXITY 2 7/2/2020 Joe Vargas GP 1          PT G-Codes  Outcome Measure Options: AM-PAC 6 Clicks Basic Mobility (PT), Tinetti  AM-PAC 6 Clicks Score (PT): 19  AM-PAC 6 Clicks Score (OT): 19  Tinetti Total Score: 19    Joe Vargas  7/2/2020

## 2020-07-02 NOTE — PLAN OF CARE
Problem: Patient Care Overview  Goal: Plan of Care Review  Outcome: Ongoing (interventions implemented as appropriate)  Flowsheets  Taken 7/2/2020 1026  Plan of Care Reviewed With: patient  Taken 7/2/2020 9982  Outcome Summary: PT eval completed on this date. Patient reports quite a few falls in the past 12 months. Bed mobility: SPV for supine>sit transfer with HOB elevated. Transfers: CGA sit<>stand and toliet transfer with Straight cane Gait: Patti for ambulation 10'x2 with Straight cane. Patient had multiple minor LOB with while turning and talking. Balance: Tinetti balance and gait assessment completed 19/28 which indicates high fall risk. Patient would benefit from skilled PT to address issues in functional mobility and balance. Recommend Home with assist and HHPT.

## 2020-07-02 NOTE — PROGRESS NOTES
[facility]  INPATIENT PROGRESS NOTE    Length of Stay: 1  Date of Admission: 6/30/2020  Primary Care Physician: Zelalem Diaz MD    Subjective   Chief Complaint: altered mental status  HPI:  79 year old  male with past medical history of type 2 DM, dementia, chronic narcotic use who presented on 6/30/2020 with altered mental status that improved with Narcan.  He is currently admitted due to possible accidental opiate overdose and community acquired pneumonia.  During today's visit, patient is alert and oriented to person and place only.  Patient's caregiver/ex wife reports that his mental status is improved.  She has reported to  that patient previously managed his medications but that she will be doing so going forward.     Review of Systems   Unable to perform ROS: Dementia        All pertinent negatives and positives are as above. All other systems have been reviewed and are negative unless otherwise stated.     Objective    Temp:  [97.5 °F (36.4 °C)-98.8 °F (37.1 °C)] 97.9 °F (36.6 °C)  Heart Rate:  [52-72] 72  Resp:  [16-18] 16  BP: (138-164)/(65-78) 158/76    Physical Exam   Constitutional: Vital signs are normal. He appears cachectic. He is cooperative. No distress.   HENT:   Head: Normocephalic and atraumatic.   Right Ear: External ear normal.   Left Ear: External ear normal.   Nose: Nose normal.   Eyes: Conjunctivae are normal. Right eye exhibits no discharge. Left eye exhibits no discharge. No scleral icterus.   Neck: Normal range of motion. Neck supple. No JVD present.   Cardiovascular: Normal rate, regular rhythm, normal heart sounds and intact distal pulses. Exam reveals no gallop and no friction rub.   No murmur heard.  Pulmonary/Chest: Effort normal and breath sounds normal. No stridor. No respiratory distress. He has no wheezes. He has no rales.   Abdominal: Soft. Bowel sounds are normal. He exhibits no distension.  There is no tenderness.   Musculoskeletal: Normal range of motion. He exhibits no edema.   Neurological: He is alert.   Oriented to person and place   Skin: Skin is warm and dry. He is not diaphoretic.   Psychiatric: He has a normal mood and affect. His behavior is normal.   Nursing note and vitals reviewed.          Results Review:  I have reviewed the labs, radiology results, and diagnostic studies.    Laboratory Data:   Results from last 7 days   Lab Units 07/02/20  0742 07/01/20  0545 06/30/20  1801   SODIUM mmol/L 139 139 140   POTASSIUM mmol/L 4.1 3.9 4.0   CHLORIDE mmol/L 104 105 102   CO2 mmol/L 22.0 23.0 22.0   BUN mg/dL 19 20 20   CREATININE mg/dL 0.97 1.00 1.24   GLUCOSE mg/dL 90 113* 143*   CALCIUM mg/dL 9.9 9.4 10.0   BILIRUBIN mg/dL 0.2  --  0.3   ALK PHOS U/L 57  --  68   ALT (SGPT) U/L 9  --  6   AST (SGOT) U/L 26  --  17   ANION GAP mmol/L 13.0 11.0 16.0*     Estimated Creatinine Clearance: 56.1 mL/min (by C-G formula based on SCr of 0.97 mg/dL).  Results from last 7 days   Lab Units 07/01/20  0545 06/30/20  1801   MAGNESIUM mg/dL  --  1.5*   PHOSPHORUS mg/dL 2.7  --          Results from last 7 days   Lab Units 07/02/20  0742 07/01/20  0545 06/30/20  1801   WBC 10*3/mm3 7.02 7.59 11.71*   HEMOGLOBIN g/dL 10.9* 9.9* 11.5*   HEMATOCRIT % 33.5* 30.0* 35.7*   PLATELETS 10*3/mm3 246 243 309           Culture Data:   Blood Culture   Date Value Ref Range Status   06/30/2020 No growth at 24 hours  Preliminary     No results found for: URINECX  No results found for: RESPCX  No results found for: WOUNDCX  No results found for: STOOLCX  No components found for: BODYFLD    Radiology Data:   Imaging Results (Last 24 Hours)     ** No results found for the last 24 hours. **          I have reviewed the patient's current medications.     Assessment/Plan     Active Hospital Problems    Diagnosis   • **Transient alteration of awareness   • CAP (community acquired pneumonia)   • Narcotic overdose (CMS/HCC)      accidental     • Type 2 diabetes mellitus (CMS/HCC)   • Essential hypertension       Plan:    1. AMS/Suspected accidental opiate overdose: improved with Narcan.  Ct of head unremarkable for acute findings.  Atrophy and small vessel changes noted.  Likely some degree of vascular dementia.  Caregiver/ex wife updated on plan of care and need for patient to have assistance in medication management.  Patient and caregiver declined needs for home health and Kami Burden agreeable to assist with patient's meds.  2. CAP: possible aspiration.  Continue rocephin and azithromycin.  o2 PRN.  PT and OT. Wean o2 as able.   3. Type 2 DM: FSBS AC and HS with SSI.  ADA diet.   4. HTN:      Discharge Planning: I expect patient to be discharged to home in 1 days.          This document has been electronically signed by SANDIP Reagan on July 2, 2020 09:36

## 2020-07-02 NOTE — PLAN OF CARE
Problem: Patient Care Overview  Goal: Plan of Care Review  Outcome: Ongoing (interventions implemented as appropriate)  Flowsheets  Taken 7/2/2020 0029  Progress: no change  Outcome Summary: No complaints at this time, will continue to monitor  Taken 7/1/2020 2125  Plan of Care Reviewed With: patient

## 2020-07-03 VITALS
DIASTOLIC BLOOD PRESSURE: 80 MMHG | HEIGHT: 72 IN | WEIGHT: 136.8 LBS | HEART RATE: 88 BPM | TEMPERATURE: 97.6 F | SYSTOLIC BLOOD PRESSURE: 150 MMHG | BODY MASS INDEX: 18.53 KG/M2 | OXYGEN SATURATION: 97 % | RESPIRATION RATE: 21 BRPM

## 2020-07-03 PROCEDURE — 97530 THERAPEUTIC ACTIVITIES: CPT | Performed by: PHYSICAL THERAPIST

## 2020-07-03 PROCEDURE — 97116 GAIT TRAINING THERAPY: CPT | Performed by: PHYSICAL THERAPIST

## 2020-07-03 PROCEDURE — 82962 GLUCOSE BLOOD TEST: CPT

## 2020-07-03 RX ORDER — CEFDINIR 300 MG/1
300 CAPSULE ORAL 2 TIMES DAILY
Qty: 10 CAPSULE | Refills: 0 | Status: SHIPPED | OUTPATIENT
Start: 2020-07-03 | End: 2020-07-08

## 2020-07-03 RX ADMIN — GABAPENTIN 300 MG: 300 CAPSULE ORAL at 09:11

## 2020-07-03 RX ADMIN — ATORVASTATIN CALCIUM 20 MG: 20 TABLET, FILM COATED ORAL at 09:11

## 2020-07-03 RX ADMIN — FENOFIBRATE 145 MG: 145 TABLET ORAL at 09:10

## 2020-07-03 RX ADMIN — CLOPIDOGREL BISULFATE 75 MG: 75 TABLET ORAL at 09:11

## 2020-07-03 RX ADMIN — ESCITALOPRAM OXALATE 10 MG: 10 TABLET ORAL at 09:11

## 2020-07-03 RX ADMIN — SODIUM CHLORIDE, PRESERVATIVE FREE 10 ML: 5 INJECTION INTRAVENOUS at 09:11

## 2020-07-03 RX ADMIN — ASPIRIN 81 MG: 81 TABLET, COATED ORAL at 09:10

## 2020-07-03 NOTE — PLAN OF CARE
Problem: Patient Care Overview  Goal: Plan of Care Review  Outcome: Ongoing (interventions implemented as appropriate)  Flowsheets (Taken 7/3/2020 1017)  Outcome Summary: PT treatment performed. Pt progressing toward all goals. Able to ambulate x 220' CGA with SPC, CGA with transfers sit to stand and toilet transfers with SPC, scored 17/28 on the Tinetti Balance test, a slight regression compared to testing done yesterday.  Able to climb up/down 3 steps with 2 handrails with SBA. Recommend upon d/c home with family to assist and home health PT to follow up for additional safety/LE  conditioning.

## 2020-07-03 NOTE — THERAPY TREATMENT NOTE
Acute Care - Physical Therapy Treatment Note  [facility]     Patient Name: Eduardo De Leon  : 1940  MRN: [id number]  Today's Date: 7/3/2020  Onset of Illness/Injury or Date of Surgery: 20          Admit Date: 2020    Visit Dx:    ICD-10-CM ICD-9-CM   1. Transient alteration of awareness R40.4 780.02   2. Hypomagnesemia E83.42 275.2   3. Opacity of lung on imaging study R91.8 793.19   4. Impaired mobility and activities of daily living Z74.09 V49.89    Z78.9    5. 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   • Primary open angle glaucoma of left eye, mild stage   • Primary open angle glaucoma of both eyes, mild stage   • Transient alteration of awareness   • CAP (community acquired pneumonia)   • Narcotic overdose (CMS/HCC)   • Type 2 diabetes mellitus (CMS/HCC)   • Essential hypertension       Therapy Treatment    Rehabilitation Treatment Summary     Row Name 20 0834             Treatment Time/Intention    Discipline  physical therapist  -BS      Document Type  therapy note (daily note)  -BS      Subjective Information  no complaints  -BS      Mode of Treatment  individual therapy;physical therapy  -BS      Patient/Family Observations  no family present  -BS      Existing Precautions/Restrictions  fall;oxygen therapy device and L/min  -BS      Recorded by [BS] Jasen Lozoya, PT 20 0839      Row Name 20 0834             Vital Signs    Pre Systolic BP Rehab  179  -BS      Pre Treatment Diastolic BP  88  -BS2      Pretreatment Heart Rate (beats/min)  70  -BS      Intratreatment Heart Rate (beats/min)  96  -BS3      Pre SpO2 (%)  98  -BS      O2 Delivery Pre Treatment  room air  -BS      Intra SpO2 (%)  99  -BS3      O2 Delivery Intra Treatment  room air  -BS3      Pre Patient Position  Supine  -BS3       Intra Patient Position  Sitting  -BS3      Post Patient Position  Supine  -BS4      Recorded by [BS] Jasen Lozoya, PT 07/03/20 0852  [BS2] Jasen Lozoya, PT 07/03/20 0905  [BS3] Jasen Lozoya, PT 07/03/20 0859  [BS4] Jasen Lozoya, PT 07/03/20 1014      Row Name 07/03/20 0834             Cognitive Assessment/Intervention- PT/OT    Affect/Mental Status (Cognitive)  WNL  -BS      Orientation Status (Cognition)  oriented to;person;place  -BS      Follows Commands (Cognition)  WFL  -BS      Recorded by [BS] Jasen Lozoya, PT 07/03/20 1014      Row Name 07/03/20 0834             Bed Mobility Assessment/Treatment    Bed Mobility Assessment/Treatment  supine-sit;scooting/bridging  -BS      Scooting/Bridging [place] (Bed Mobility)  independent  -BS      Supine-Sit [place] (Bed Mobility)  conditional independence  -BS      Recorded by [BS] Jasen Lozoya, PT 07/03/20 0841      Row Name 07/03/20 0834             Transfer Assessment/Treatment    Transfer Assessment/Treatment  sit-stand transfer  -BS      Recorded by [BS] Jasen Lozoya, PT 07/03/20 1014      Row Name 07/03/20 0834             Sit-Stand Transfer    Sit-Stand [place] (Transfers)  contact guard  -BS      Assistive Device (Sit-Stand Transfers)  cane, straight  -BS      Recorded by [BS] Jasen Lozoya, PT 07/03/20 1014      Row Name 07/03/20 0834             Toilet Transfer    Type (Toilet Transfer)  stand-sit;stand pivot/stand step  -BS      [place] Level (Toilet Transfer)  contact guard  -BS      Assistive Device (Toilet Transfer)  cane, straight  -BS      Recorded by [BS] Jasen Lozoya, PT 07/03/20 1014      Row Name 07/03/20 0834             Gait/Stairs Assessment/Training    Gait/Stairs Assessment/Training  gait/ambulation independence  -BS      [place] Level (Gait)  contact guard  -BS      Assistive Device (Gait)  cane, straight  -BS      Distance in Feet (Gait)  220' x 1  -BS      Pattern (Gait)  step-through  -BS       Deviations/Abnormal Patterns (Gait)  other (see comments) excessive trunk/cervical flex, gazing excessively at floor  -BS      Bilateral Gait Deviations  heel strike decreased  -BS      Negotiation (Stairs)  stairs independence  -BS      [place] Level (Stairs)  stand by assist  -BS      Handrail Location (Stairs)  left side (ascending);right side (ascending)  -BS      Number of Steps (Stairs)  3  -BS      Ascending Technique (Stairs)  step-over-step  -BS      Descending Technique (Stairs)  step-over-step  -BS      Comment (Gait/Stairs)  decreased heel strike bilaterally, vc's to stand erect due to excessive thoracic kyphosis, able to self correct with cues but not maintain erect posture.  -BS      Recorded by [BS] Jasen Lozoya, PT 07/03/20 1014      Row Name 07/03/20 0834             Therapeutic Exercise    Comment (Therapeutic Exercise)  seated therex to B LE's: LAQ's x 10, hip flex x 10 each  -BS      Recorded by [BS] Jasen Lozoya, PT 07/03/20 1016      Row Name 07/03/20 0834             Positioning and Restraints    Pre-Treatment Position  in bed  -BS      Post Treatment Position  bed  -BS2      In Bed  fowlers  -BS2      Recorded by [BS] Jasen Lozoya, PT 07/03/20 0839  [BS2] Jasen Lozoya, PT 07/03/20 1016      Row Name 07/03/20 0834             Pain Scale: Numbers Pre/Post-Treatment    Pain Scale: Numbers, Pretreatment  0/10 - no pain  -BS      Pain Scale: Numbers, Post-Treatment  3/10 7/10 back pain, 3/10 R knee pain during gait training  -BS2      Recorded by [BS] Jasen Lozoya, PT 07/03/20 0839  [BS2] Jasen Lozoya, PT 07/03/20 1014      Row Name 07/03/20 0834             Plan of Care Review    Plan of Care Reviewed With  patient  -BS      Progress  improving  -BS      Recorded by [BS] Jasen Lozoya, PT 07/03/20 1014      Row Name 07/03/20 0834             Outcome Summary/Treatment Plan (PT)    Daily Summary of Progress (PT)  progress toward functional goals as expected  -BS      Barriers to  Overall Progress (PT)  pain  -BS      Plan for Continued Treatment (PT)  anticipated d/c later today. Continue to address balance in standing.  -BS      Anticipated Discharge Disposition (PT)  home;home with assist;home with home health  -BS      Recorded by [BS] Jasen Lozoya, PT 07/03/20 1014        User Key  (r) = Recorded By, (t) = Taken By, (c) = Cosigned By    Initials Name Effective Dates Discipline    BS Jasen Lozoya, PT 01/20/20 -  PT               Rehab Goal Summary     Row Name 07/03/20 0834             Bed Mobility Goal 1 (PT)    Activity/Assistive Device (Bed Mobility Goal 1, PT)  sit to supine;supine to sit  -BS      [place] Level/Cues Needed (Bed Mobility Goal 1, PT)  independent  -BS      Time Frame (Bed Mobility Goal 1, PT)  by discharge  -BS      Progress/Outcomes (Bed Mobility Goal 1, PT)  (S) goal met  -BS         Transfer Goal 1 (PT)    Activity/Assistive Device (Transfer Goal 1, PT)  sit-to-stand/stand-to-sit;bed-to-chair/chair-to-bed  -BS      [place] Level/Cues Needed (Transfer Goal 1, PT)  conditional independence  -BS      Time Frame (Transfer Goal 1, PT)  by discharge  -BS      Progress/Outcome (Transfer Goal 1, PT)  goal not met  -BS         Gait Training Goal 1 (PT)    Activity/Assistive Device (Gait Training Goal 1, PT)  gait (walking locomotion);assistive device use;backward stepping  -BS      [place] Level (Gait Training Goal 1, PT)  conditional independence  -BS      Distance (Gait Goal 1, PT)  150x1  -BS      Time Frame (Gait Training Goal 1, PT)  by discharge  -BS      Progress/Outcome (Gait Training Goal 1, PT)  goal partially met  -BS         Stairs Goal 1 (PT)    Activity/Assistive Device (Stairs Goal 1, PT)  stairs, all skills;ascending stairs;descending stairs  -BS      [place] Level/Cues Needed (Stairs Goal 1, PT)  conditional independence  -BS      Number of Stairs (Stairs Goal 1, PT)  3  -BS      Progress/Outcome (Stairs Goal 1, PT)  goal partially  met met for # of steps, not level of assist  -BS         Patient Education Goal (PT)    Activity (Patient Education Goal, PT)  Patient will completed Tinetti balance and gait assessment  >24/28 to indicated low fall risk.  -BS      [place]/Cues/Accuracy (Memory Goal 2, PT)  demonstrates adequately  -BS      Time Frame (Patient Education Goal, PT)  by discharge  -BS      Progress/Outcome (Patient Education Goal, PT)  goal not met  -BS        User Key  (r) = Recorded By, (t) = Taken By, (c) = Cosigned By    Initials Name Provider Type Discipline    Jasen Osorio, PT Physical Therapist PT          Physical Therapy Education                 Title: PT OT SLP Therapies (In Progress)     Topic: Physical Therapy (In Progress)     Point: Mobility training (Done)     Description:   Instruct learner(s) on safety and technique for assisting patient out of bed, chair or wheelchair.  Instruct in the proper use of assistive devices, such as walker, crutches, cane or brace.              Patient Friendly Description:   It's important to get you on your feet again, but we need to do so in a way that is safe for you. Falling has serious consequences, and your personal safety is the most important thing of all.        When it's time to get out of bed, one of us or a family member will sit next to you on the bed to give you support.     If your doctor or nurse tells you to use a walker, crutches, a cane, or a brace, be sure you use it every time you get out of bed, even if you think you don't need it.    Learning Progress Summary           Patient Acceptance, E,TB, VU by LR at 7/2/2020 1023    Comment:  PT educated on PT POC, role of PT and expectations. Patient required verbal/tactile cues for hand placement and to increase safety during transfer.                   Point: Home exercise program (Not Started)     Description:   Instruct learner(s) on appropriate technique for monitoring, assisting and/or progressing patient with  therapeutic exercises and activities.              Learner Progress:   Not documented in this visit.          Point: Body mechanics (Not Started)     Description:   Instruct learner(s) on proper positioning and spine alignment for patient and/or caregiver during mobility tasks and/or exercises.              Learner Progress:   Not documented in this visit.          Point: Precautions (Not Started)     Description:   Instruct learner(s) on prescribed precautions during mobility and gait tasks              Learner Progress:   Not documented in this visit.                      User Key     Initials Effective Dates Name Provider Type Discipline    LR 06/25/19 -  Joe Vargas Physical Therapist PT                PT Recommendation and Plan  Anticipated Discharge Disposition (PT): home, home with assist, home with home health  Outcome Summary/Treatment Plan (PT)  Daily Summary of Progress (PT): progress toward functional goals as expected  Barriers to Overall Progress (PT): pain  Plan for Continued Treatment (PT): anticipated d/c later today. Continue to address balance in standing.  Anticipated Discharge Disposition (PT): home, home with assist, home with home health  Plan of Care Reviewed With: patient  Progress: improving  Outcome Measures     Row Name 07/03/20 0834 07/01/20 6405          How much help from another is currently needed...    Putting on and taking off regular lower body clothing?  --  3  -JH     Bathing (including washing, rinsing, and drying)  --  3  -JH     Toileting (which includes using toilet bed pan or urinal)  --  3  -JH     Putting on and taking off regular upper body clothing  --  3  -JH     Taking care of personal grooming (such as brushing teeth)  --  3  -JH     Eating meals  --  4  -JH     AM-PAC 6 Clicks Score (OT)  --  19  -JH        Tinetti Assessment    Sitting Balance  1  -BS  --     Arises  1  -BS  --     Attempts to Rise  1  -BS  --     Immediate Standing Balance (first 5 sec)  1   "-BS  --     Standing Balance  1  -BS  --     Sternal Nudge (feet close together)  2  -BS  --     Eyes Closed (feet close together)  0  -BS  --     Turning 360 Degrees- Steps  0  -BS  --     Turning 360 Degrees- Steadiness  0  -BS  --     Sitting Down  1  -BS  --     T[place]etti Balance Score  8  -BS  --     Gait Initiation (immediate after told \"go\")  1  -BS  --     Step Length- Right Swing  1  -BS  --     Step Length- Left Swing  1  -BS  --     Foot Clearance- Right Foot  1  -BS  --     Foot Clearance- Left Foot  1  -BS  --     Step Symmetry  1  -BS  --     Step Continuity  1  -BS  --     Path (excursion)  1  -BS  --     Trunk  0  -BS  --     Base of Support  1  -BS  --     Gait Score  9  -BS  --     Tinetti Total Score  17  -BS  --     Tinetti Assistive Device  rolling walker  -BS  --        Functional Assessment    Outcome Measure Options  --  AM-PAC 6 Clicks Daily Activity (OT)  -       User Key  (r) = Recorded By, (t) = Taken By, (c) = Cosigned By    Initials Name Provider Type    Jasen Osorio, PT Physical Therapist    Frederick Randhawa, OT Occupational Therapist         Time Calculation:   PT Charges     Row Name 07/03/20 0905             Time Calculation    Start Time  0834  -BS      Stop Time  0905  -BS      Time Calculation (min)  31 min  -BS      PT Received On  07/03/20  -BS      PT Goal Re-Cert Due Date  07/15/20  -BS        User Key  (r) = Recorded By, (t) = Taken By, (c) = Cosigned By    Initials Name Provider Type    Jasen Osorio PT Physical Therapist        Therapy Charges for Today     Code Description Service Date Service Provider Modifiers Qty    99873601980 HC GAIT TRAINING EA 15 MIN 7/3/2020 Jasen Lozoya, PT GP 1    84907934762 HC PT THERAPEUTIC ACT EA 15 MIN 7/3/2020 Jasen Lozoya, PT GP 1          PT G-Codes  Outcome Measure Options: AM-PAC 6 Clicks Basic Mobility (PT), Tinetti  AM-PAC 6 Clicks Score (PT): 19  AM-PAC 6 Clicks Score (OT): 19  T[place]etti Total Score: 17    Jasen Lozoya, " PT  7/3/2020

## 2020-07-03 NOTE — DISCHARGE SUMMARY
[facility] Medicine Services  DISCHARGE SUMMARY       Date of Admission: 6/30/2020  Date of Discharge:  7/3/2020  Primary Care Physician: Zelalem Diaz MD    Presenting Problem/History of Present Illness:  Transient alteration of awareness [R40.4]  Hypomagnesemia [E83.42]  Opacity of lung on imaging study [R91.8]  Transient alteration of awareness [R40.4]       Final Discharge Diagnoses:  Active Hospital Problems    Diagnosis   • **Transient alteration of awareness   • CAP (community acquired pneumonia)   • Narcotic overdose (CMS/[facility])     accidental     • Type 2 diabetes mellitus (CMS/[facility])   • Essential hypertension       Consults:   Consults     No orders found from 6/1/2020 to 7/1/2020.          Procedures Performed:                 Pertinent Test Results:   Lab Results (last 24 hours)     Procedure Component Value Units Date/Time    POC Glucose Once [[id number]]  (Normal) Collected:  07/02/20 1928    Specimen:  Blood Updated:  07/02/20 1948     Glucose 104 mg/dL      Comment: : [id number] COLLADO DEVONMeter ID: [id number]       POC Glucose Once [[id number]]  (Normal) Collected:  07/02/20 1711    Specimen:  Blood Updated:  07/02/20 1947     Glucose 94 mg/dL      Comment: RN NotifiedOperator: [id number] NALLELY Bright ID: [id number]       Blood Culture - Blood, Arm, Right [[id number]] Collected:  06/30/20 1802    Specimen:  Blood from Arm, Right Updated:  07/02/20 1815     Blood Culture No growth at 2 days    Urinalysis With Culture If Indicated - Urine, Random Void [[id number]]  (Abnormal) Collected:  07/02/20 1228    Specimen:  Urine, Random Void Updated:  07/02/20 1250     Color, UA Yellow     Appearance, UA Clear     pH, UA 5.5     Specific Gravity, UA 1.016     Glucose, UA Negative     Ketones, UA 15 mg/dL (1+)     Bilirubin, UA Negative     Blood, UA Negative     Protein, UA Trace     Leuk Esterase, UA Negative     Nitrite, UA Negative     Urobilinogen,  UA 0.2 E.U./dL    Narrative:       Urine microscopic not indicated.    POC Glucose Once [[id number]]  (Normal) Collected:  07/02/20 1035    Specimen:  Blood Updated:  07/02/20 1238     Glucose 99 mg/dL      Comment: RN NotifiedOperator: [id number] NALLELY Bright ID: [id number]           Imaging Results (All)     Procedure Component Value Units Date/Time    CT Head Without Contrast [[id number]] Collected:  06/30/20 2043     Updated:  06/30/20 2121    Narrative:         CT head without contrast on 6/30/2020     CLINICAL INDICATION: Altered mental status    TECHNIQUE:  Multiple axial images are obtained throughout the  head without the administration of contrast.  This exam was  performed according to our departmental dose-optimization  program, which includes automated exposure control, adjustment of  the mA and/or kV according to patient size and/or use of  iterative reconstruction technique.  Total DLP is 908.2 mGy*cm.    COMPARISON: CT from 6/1/2014 and MRI from 6/2/2014    FINDINGS:  There is generalized cerebral atrophy. There is low  density in the periventricular white matter consistent with  chronic small vessel ischemic changes. There is no hydrocephalus.  There is no hemorrhage. There are no abnormal extra-axial fluid  collections. There is no mass, mass effect or midline shift.  There is no CT evidence of acute infarct. No bony abnormality is  noted.      Impression:       Atrophy and chronic small vessel ischemic changes  with no acute intracranial abnormality.    Electronically signed by:  Edmundo Infante  6/30/2020 9:20 PM  CDT Workstation: [id number]    XR Chest 1 View [[id number]] Collected:  06/30/20 1853     Updated:  06/30/20 1938    Narrative:       PROCEDURE: XR CHEST 1 VW    VIEWS:Single    INDICATION: Altered mental status    COMPARISON: CXR: 6/1/2014    FINDINGS:       - lines/tubes: None    - cardiac: Size within normal limits.    - mediastinum: Contour within normal limits.     - lungs: There  "is increased interstitial prominence, and an  area of mild patchy airspace opacification the left perihilar  lung zone.     - pleura: No evidence of  fluid.      - osseous: Unremarkable for age.      Impression:       Residual airspace opacifications as above, may represent mild  asymmetric edema or pneumonia. Clinical correlation needed.      Electronically signed by:  Christen Diaz MD  6/30/2020 7:37 PM CDT  Workstation: [id number]            Chief Complaint on Day of Discharge: none    Hospital Course:  79 year old  male with past medical history of type 2 DM, dementia, chronic narcotic use who presented on 6/30/2020 with altered mental status that improved with Narcan.  He is currently admitted due suspected accidental opiate overdose and community acquired pneumonia.  Ex-wife/caregiver reported that patient was previously managing his medications prior to admission, however, going forward she has agreed to administer and manage his meds.  She is instructed to hold pain meds if patient is sedated or drowsy.  Patient had no previous issues on Morphine and Neurontin doses ordered by his pain management clinic so these doses were continued on discharge as patient is now awake, alert, oriented.  [facility] # [id number] reviewed and patient receiving Gabapentin 600 mg three times per day and Morphine ER 30 mg twice daily.  Patient received IV Rocephin, Azithromycin while in the hospital due to pneumonia.  He is continued on oral Omnicef for completion of treatment at discharge.     Condition on Discharge:  Stable     Physical Exam on Discharge:  /80 (BP Location: Right arm, Patient Position: Lying)   Pulse 88   Temp 97.6 °F (36.4 °C) (Axillary)   Resp 21   Ht 182.9 cm (72\")   Wt 62.1 kg (136 lb 12.8 oz)   SpO2 97%   BMI 18.55 kg/m²   Physical Exam  Constitutional: Vital signs are normal. He appears cachectic. He is cooperative. No distress.   HENT:   Head: Normocephalic and atraumatic.   Right Ear: " External ear normal.   Left Ear: External ear normal.   Nose: Nose normal.   Eyes: Conjunctivae are normal. Right eye exhibits no discharge. Left eye exhibits no discharge. No scleral icterus.   Neck: Normal range of motion. Neck supple. No JVD present.   Cardiovascular: Normal rate, regular rhythm, normal heart sounds and intact distal pulses. Exam reveals no gallop and no friction rub.   No murmur heard.  Pulmonary/Chest: Effort normal and breath sounds normal. No stridor. No respiratory distress. He has no wheezes. He has no rales.   Abdominal: Soft. Bowel sounds are normal. He exhibits no distension. There is no tenderness.   Musculoskeletal: Normal range of motion. He exhibits no edema.   Neurological: He is alert.   Oriented to person, place, and time.  Skin: Skin is warm and dry. He is not diaphoretic.   Psychiatric: He has a normal mood and affect. His behavior is normal.   Nursing note and vitals reviewed.    Discharge Disposition:  Home or Self Care    Discharge Medications:     Discharge Medications      New Medications      Instructions Start Date   cefdinir 300 MG capsule  Commonly known as:  OMNICEF   300 mg, Oral, 2 Times Daily         Continue These Medications      Instructions Start Date   aspirin 81 MG tablet   81 mg, Oral, Daily      atorvastatin 20 MG tablet  Commonly known as:  LIPITOR   20 mg, Oral, Daily      clopidogrel 75 MG tablet  Commonly known as:  PLAVIX   75 mg, Oral, Daily      colestipol 1 g tablet  Commonly known as:  COLESTID   1 g, Oral, 3 Times Daily      escitalopram 10 MG tablet  Commonly known as:  LEXAPRO   20 mg, Oral, Daily      gabapentin 600 MG tablet  Commonly known as:  NEURONTIN   600 mg, Oral, 3 Times Daily      glucose blood test strip   1 strip      latanoprost 0.005 % ophthalmic solution  Commonly known as:  XALATAN   1 drop, Left Eye, Nightly      metFORMIN 500 MG tablet  Commonly known as:  GLUCOPHAGE   TK 1 T PO BID WITH MEALS      Morphine 30 MG 12 hr  tablet  Commonly known as:  MS CONTIN   30 mg, Oral, 2 times daily      NIFEdipine XL 60 MG 24 hr tablet  Commonly known as:  PROCARDIA XL   60 mg, Oral, Daily      nitroglycerin 0.4 MG SL tablet  Commonly known as:  NITROSTAT   0.4 mg, Sublingual, As Needed, 1 tablet, sublingual as needed Sublingual PRN       quinapril 20 MG tablet  Commonly known as:  ACCUPRIL   20 mg, Oral, Daily         Stop These Medications    Calcium Carbonate-Vitamin D3 600-400 MG-UNIT tablet     metoprolol tartrate 50 MG tablet  Commonly known as:  LOPRESSOR     pantoprazole 40 MG EC tablet  Commonly known as:  PROTONIX            Discharge Diet:   Diet Instructions     Diet: Consistent Carbohydrate; Thin      Discharge Diet:  Consistent Carbohydrate    Fluid Consistency:  Thin          Activity at Discharge:   Activity Instructions     Activity as Tolerated            Discharge Care Plan/Instructions: Follow up with PCP within one week.  Keep scheduled follow up with pain management.     Follow-up Appointments:   Additional Instructions for the Follow-ups that You Need to Schedule     Discharge Follow-up with PCP   As directed       Currently Documented PCP:    Zelalem Diaz MD    PCP Phone Number:    [phone]     Follow Up Details:  one week           Follow-up Information     Zelalem Diaz MD .    Specialty:  Family Medicine  Why:  one week  Contact information:  [address] [place] [id number]  [phone]                   Test Results Pending at Discharge:    Order Current Status    Blood Culture - Blood, Arm, Left Preliminary result    Blood Culture - Blood, Arm, Right Preliminary result                This document has been electronically signed by SANDIP Reagan on July 3, 2020 09:55        Time: 30 minutes spent on assessment, discussion, management, and discharge planning for this patient.

## 2020-07-03 NOTE — PLAN OF CARE
Problem: Patient Care Overview  Goal: Plan of Care Review  Outcome: Ongoing (interventions implemented as appropriate)  Flowsheets  Taken 7/3/2020 0116  Progress: no change  Outcome Summary: No complaints at this time, will continue to monitor  Taken 7/2/2020 2049  Plan of Care Reviewed With: patient

## 2020-07-04 ENCOUNTER — READMISSION MANAGEMENT (OUTPATIENT)
Dept: CALL CENTER | Facility: HOSPITAL | Age: 80
End: 2020-07-04

## 2020-07-04 LAB
GLUCOSE BLDC GLUCOMTR-MCNC: 122 MG/DL (ref 70–130)
GLUCOSE BLDC GLUCOMTR-MCNC: 93 MG/DL (ref 70–130)

## 2020-07-04 NOTE — OUTREACH NOTE
Prep Survey      Responses   [facility] facility patient discharged from?  [facility]   Is LACE score < 7 ?  No   Eligibility  Readm Mgmt   Discharge diagnosis  Transient alteration of awareness,  accidental OD,  CAP   COVID-19 Test Status  Negative   Does the patient have one of the following disease processes/diagnoses(primary or secondary)?  COPD/Pneumonia   Does the patient have Home health ordered?  No   Is there a DME ordered?  No   Comments regarding appointments  call for apmt   Medication alerts for this patient  see AVS   Prep survey completed?  Yes          Nicole Pruitt RN

## 2020-07-05 LAB — BACTERIA SPEC AEROBE CULT: NORMAL

## 2020-07-06 ENCOUNTER — READMISSION MANAGEMENT (OUTPATIENT)
Dept: CALL CENTER | Facility: HOSPITAL | Age: 80
End: 2020-07-06

## 2020-07-06 LAB — BACTERIA SPEC AEROBE CULT: NORMAL

## 2020-07-06 NOTE — OUTREACH NOTE
COPD/PN Week 1 Survey      Responses   [facility] patient discharged from?  [place]   COVID-19 Test Status  Negative   Does the patient have one of the following disease processes/diagnoses(primary or secondary)?  COPD/Pneumonia   Is there a successful TCM telephone encounter documented?  No   Week 1 attempt successful?  No   Unsuccessful attempts  Attempt 1          Matthieu Knox RN

## 2020-07-08 ENCOUNTER — READMISSION MANAGEMENT (OUTPATIENT)
Dept: CALL CENTER | Facility: HOSPITAL | Age: 80
End: 2020-07-08

## 2020-07-08 NOTE — OUTREACH NOTE
COPD/PN Week 1 Survey      Responses   [facility] patient discharged from?  [place]   COVID-19 Test Status  Negative   Does the patient have one of the following disease processes/diagnoses(primary or secondary)?  COPD/Pneumonia   Is there a successful TCM telephone encounter documented?  No   Week 1 attempt successful?  No   Unsuccessful attempts  Attempt 2          Chuckie Neri RN

## 2020-07-10 ENCOUNTER — READMISSION MANAGEMENT (OUTPATIENT)
Dept: CALL CENTER | Facility: HOSPITAL | Age: 80
End: 2020-07-10

## 2020-07-10 NOTE — OUTREACH NOTE
COPD/PN Week 1 Survey      Responses   [facility] patient discharged from?  [place]   COVID-19 Test Status  Negative   Does the patient have one of the following disease processes/diagnoses(primary or secondary)?  COPD/Pneumonia   Is there a successful TCM telephone encounter documented?  No   Week 1 attempt successful?  No   Unsuccessful attempts  Attempt 3          Blanca Strickland RN

## 2020-07-17 ENCOUNTER — READMISSION MANAGEMENT (OUTPATIENT)
Dept: CALL CENTER | Facility: HOSPITAL | Age: 80
End: 2020-07-17

## 2020-07-17 NOTE — OUTREACH NOTE
COPD/PN Week 2 Survey      Responses   [facility] patient discharged from?  [place]   COVID-19 Test Status  Negative   Does the patient have one of the following disease processes/diagnoses(primary or secondary)?  COPD/Pneumonia   Was the primary reason for admission:  Pneumonia   Week 2 attempt successful?  Yes   Call start time  1432   Call end time  1440   Discharge diagnosis  Transient alteration of awareness,  accidental OD,  CAP   Is patient permission given to speak with other caregiver?  Yes   List who call center can speak with  Kami--SO   Person spoke with today (if not patient) and relationship  Kami   Meds reviewed with patient/caregiver?  Yes   Is the patient taking all medications as directed (includes completed medication regime)?  Yes   Medication comments  Taking antibx   Does the patient have a primary care provider?   Yes   Does the patient have an appointment with their PCP or pulmonologist within 7 days of discharge?  Yes   Has the patient kept scheduled appointments due by today?  N/A   Comments  appt next month 8/6 Dr Mauricio   Has home health visited the patient within 72 hours of discharge?  N/A   Pulse Ox monitoring  None   Psychosocial issues?  No   Did the patient receive a copy of their discharge instructions?  Yes   Nursing interventions  Reviewed instructions with patient   What is the patient's perception of their health status since discharge?  Improving   Nursing Interventions  Nurse provided patient education   Are the patient's immunizations up to date?   Yes   Nursing interventions  Educated on importance of maintaining up to date immunizations as advised by provider, Advised patient to discuss with provider at next visit   If the patient is a current smoker, are they able to teach back resources for cessation?  Smoking cessation medications, [id number] [Both pt and So smoke]   Is the patient/caregiver able to teach back the hierarchy of who to call/visit for  symptoms/problems? PCP, Specialist, Home health nurse, Urgent Care, ED, 911  Yes   Additional teach back comments  SO states pt doing better--was sleeping. Enc no smoking   Is the patient/caregiver able to teach back signs and symptoms of worsening condition:  Fever/chills, Shortness of breath, Chest pain   Is the patient/caregiver able to teach back importance of completing antibiotic course of treatment?  Yes   Week 2 call completed?  Yes          Merry Ram RN

## 2021-04-13 ENCOUNTER — HOSPITAL ENCOUNTER (OUTPATIENT)
Dept: GENERAL RADIOLOGY | Facility: HOSPITAL | Age: 81
Discharge: HOME | End: 2021-04-13

## 2021-04-13 ENCOUNTER — IMMUNIZATION (OUTPATIENT)
Dept: VACCINE CLINIC | Facility: HOSPITAL | Age: 81
End: 2021-04-13

## 2021-04-13 DIAGNOSIS — M54.50 LOW BACK PAIN, UNSPECIFIED BACK PAIN LATERALITY, UNSPECIFIED CHRONICITY, UNSPECIFIED WHETHER SCIATICA PRESENT: ICD-10-CM

## 2021-04-13 PROCEDURE — 0001A: CPT | Performed by: THORACIC SURGERY (CARDIOTHORACIC VASCULAR SURGERY)

## 2021-04-13 PROCEDURE — 91300 HC SARSCOV02 VAC 30MCG/0.3ML IM: CPT | Performed by: THORACIC SURGERY (CARDIOTHORACIC VASCULAR SURGERY)

## 2021-04-13 PROCEDURE — 72110 X-RAY EXAM L-2 SPINE 4/>VWS: CPT

## 2021-05-04 ENCOUNTER — APPOINTMENT (OUTPATIENT)
Dept: VACCINE CLINIC | Facility: HOSPITAL | Age: 81
End: 2021-05-04

## 2021-05-05 ENCOUNTER — IMMUNIZATION (OUTPATIENT)
Dept: VACCINE CLINIC | Facility: HOSPITAL | Age: 81
End: 2021-05-05

## 2021-05-05 PROCEDURE — 0002A: CPT | Performed by: THORACIC SURGERY (CARDIOTHORACIC VASCULAR SURGERY)

## 2021-05-05 PROCEDURE — 91300 HC SARSCOV02 VAC 30MCG/0.3ML IM: CPT | Performed by: THORACIC SURGERY (CARDIOTHORACIC VASCULAR SURGERY)

## 2022-01-01 ENCOUNTER — HOSPICE ADMISSION (OUTPATIENT)
Dept: HOSPICE | Facility: HOSPICE | Age: 82
End: 2022-01-01

## 2022-01-01 ENCOUNTER — APPOINTMENT (OUTPATIENT)
Dept: GENERAL RADIOLOGY | Facility: HOSPITAL | Age: 82
End: 2022-01-01

## 2022-01-01 ENCOUNTER — HOME HEALTH ADMISSION (OUTPATIENT)
Dept: HOME HEALTH SERVICES | Facility: HOME HEALTHCARE | Age: 82
End: 2022-01-01

## 2022-01-01 ENCOUNTER — TELEPHONE (OUTPATIENT)
Dept: PULMONOLOGY | Facility: HOSPITAL | Age: 82
End: 2022-01-01

## 2022-01-01 ENCOUNTER — HOME CARE VISIT (OUTPATIENT)
Dept: HOME HEALTH SERVICES | Facility: CLINIC | Age: 82
End: 2022-01-01

## 2022-01-01 ENCOUNTER — HOSPITAL ENCOUNTER (INPATIENT)
Facility: HOSPITAL | Age: 82
LOS: 3 days | Discharge: HOSPICE | End: 2022-08-24
Attending: EMERGENCY MEDICINE | Admitting: INTERNAL MEDICINE

## 2022-01-01 ENCOUNTER — HOME CARE VISIT (OUTPATIENT)
Dept: HOSPICE | Facility: HOSPICE | Age: 82
End: 2022-01-01

## 2022-01-01 ENCOUNTER — TRANSCRIBE ORDERS (OUTPATIENT)
Dept: HOSPICE | Facility: HOSPICE | Age: 82
End: 2022-01-01

## 2022-01-01 ENCOUNTER — APPOINTMENT (OUTPATIENT)
Dept: CT IMAGING | Facility: HOSPITAL | Age: 82
End: 2022-01-01

## 2022-01-01 ENCOUNTER — TRANSCRIBE ORDERS (OUTPATIENT)
Dept: HOME HEALTH SERVICES | Facility: CLINIC | Age: 82
End: 2022-01-01

## 2022-01-01 ENCOUNTER — HOSPITAL ENCOUNTER (EMERGENCY)
Facility: HOSPITAL | Age: 82
Discharge: HOME | End: 2022-08-24
Attending: EMERGENCY MEDICINE | Admitting: EMERGENCY MEDICINE

## 2022-01-01 ENCOUNTER — LAB REQUISITION (OUTPATIENT)
Dept: LAB | Facility: HOSPITAL | Age: 82
End: 2022-01-01

## 2022-01-01 ENCOUNTER — OFFICE VISIT (OUTPATIENT)
Dept: PULMONOLOGY | Facility: CLINIC | Age: 82
End: 2022-01-01

## 2022-01-01 VITALS
SYSTOLIC BLOOD PRESSURE: 100 MMHG | TEMPERATURE: 97.4 F | HEART RATE: 60 BPM | RESPIRATION RATE: 24 BRPM | DIASTOLIC BLOOD PRESSURE: 30 MMHG

## 2022-01-01 VITALS
BODY MASS INDEX: 13.17 KG/M2 | OXYGEN SATURATION: 95 % | SYSTOLIC BLOOD PRESSURE: 129 MMHG | HEIGHT: 70 IN | HEART RATE: 101 BPM | RESPIRATION RATE: 18 BRPM | WEIGHT: 92 LBS | DIASTOLIC BLOOD PRESSURE: 59 MMHG | TEMPERATURE: 97.9 F

## 2022-01-01 VITALS
HEART RATE: 63 BPM | WEIGHT: 92.2 LBS | SYSTOLIC BLOOD PRESSURE: 132 MMHG | DIASTOLIC BLOOD PRESSURE: 58 MMHG | OXYGEN SATURATION: 93 % | HEIGHT: 70 IN | TEMPERATURE: 96.7 F | RESPIRATION RATE: 20 BRPM | BODY MASS INDEX: 13.2 KG/M2

## 2022-01-01 VITALS
OXYGEN SATURATION: 96 % | DIASTOLIC BLOOD PRESSURE: 88 MMHG | SYSTOLIC BLOOD PRESSURE: 122 MMHG | RESPIRATION RATE: 20 BRPM | TEMPERATURE: 98.1 F | HEART RATE: 91 BPM

## 2022-01-01 VITALS
BODY MASS INDEX: 16.75 KG/M2 | HEIGHT: 70 IN | OXYGEN SATURATION: 97 % | TEMPERATURE: 97.5 F | WEIGHT: 117 LBS | SYSTOLIC BLOOD PRESSURE: 138 MMHG | HEART RATE: 80 BPM | DIASTOLIC BLOOD PRESSURE: 90 MMHG

## 2022-01-01 VITALS
DIASTOLIC BLOOD PRESSURE: 68 MMHG | SYSTOLIC BLOOD PRESSURE: 92 MMHG | HEART RATE: 120 BPM | OXYGEN SATURATION: 87 % | TEMPERATURE: 97.8 F | RESPIRATION RATE: 32 BRPM

## 2022-01-01 DIAGNOSIS — M79.89 SOFT TISSUE MASS: ICD-10-CM

## 2022-01-01 DIAGNOSIS — K56.41 FECAL IMPACTION: ICD-10-CM

## 2022-01-01 DIAGNOSIS — G30.9 ALZHEIMER DISEASE: Primary | ICD-10-CM

## 2022-01-01 DIAGNOSIS — F02.80 ALZHEIMER DISEASE: Primary | ICD-10-CM

## 2022-01-01 DIAGNOSIS — K85.90 ACUTE PANCREATITIS, UNSPECIFIED COMPLICATION STATUS, UNSPECIFIED PANCREATITIS TYPE: ICD-10-CM

## 2022-01-01 DIAGNOSIS — F17.218 CIGARETTE NICOTINE DEPENDENCE WITH OTHER NICOTINE-INDUCED DISORDER: ICD-10-CM

## 2022-01-01 DIAGNOSIS — Z00.00 ENCOUNTER FOR GENERAL ADULT MEDICAL EXAMINATION WITHOUT ABNORMAL FINDINGS: ICD-10-CM

## 2022-01-01 DIAGNOSIS — N17.9 AKI (ACUTE KIDNEY INJURY): ICD-10-CM

## 2022-01-01 DIAGNOSIS — G30.9 ALZHEIMER'S DEMENTIA WITH BEHAVIORAL DISTURBANCE, UNSPECIFIED TIMING OF DEMENTIA ONSET: Primary | ICD-10-CM

## 2022-01-01 DIAGNOSIS — J43.2 CENTRILOBULAR EMPHYSEMA: Primary | ICD-10-CM

## 2022-01-01 DIAGNOSIS — F02.81 ALZHEIMER'S DEMENTIA WITH BEHAVIORAL DISTURBANCE, UNSPECIFIED TIMING OF DEMENTIA ONSET: Primary | ICD-10-CM

## 2022-01-01 DIAGNOSIS — E83.52 HYPERCALCEMIA: ICD-10-CM

## 2022-01-01 DIAGNOSIS — C79.9 MULTIPLE LESIONS OF METASTATIC MALIGNANCY: ICD-10-CM

## 2022-01-01 DIAGNOSIS — Z51.5 HOSPICE CARE: Primary | ICD-10-CM

## 2022-01-01 DIAGNOSIS — F02.80 ALZHEIMER'S DEMENTIA WITHOUT BEHAVIORAL DISTURBANCE, UNSPECIFIED TIMING OF DEMENTIA ONSET: Primary | ICD-10-CM

## 2022-01-01 DIAGNOSIS — G93.41 METABOLIC ENCEPHALOPATHY: Primary | ICD-10-CM

## 2022-01-01 DIAGNOSIS — N39.0 UTI (URINARY TRACT INFECTION) WITH PYURIA: ICD-10-CM

## 2022-01-01 DIAGNOSIS — R41.82 ALTERED MENTAL STATUS, UNSPECIFIED ALTERED MENTAL STATUS TYPE: Primary | ICD-10-CM

## 2022-01-01 DIAGNOSIS — G30.9 ALZHEIMER'S DEMENTIA WITHOUT BEHAVIORAL DISTURBANCE, UNSPECIFIED TIMING OF DEMENTIA ONSET: Primary | ICD-10-CM

## 2022-01-01 DIAGNOSIS — K75.9 INFLAMMATORY LIVER DISEASE, UNSPECIFIED: ICD-10-CM

## 2022-01-01 LAB
25(OH)D3 SERPL-MCNC: 14.9 NG/ML (ref 30–100)
ALBUMIN SERPL-MCNC: 2.7 G/DL (ref 3.5–5.2)
ALBUMIN SERPL-MCNC: 3.3 G/DL (ref 3.5–5.2)
ALBUMIN SERPL-MCNC: 3.7 G/DL (ref 3.5–5.2)
ALBUMIN SERPL-MCNC: 4.2 G/DL (ref 3.5–5.2)
ALBUMIN/GLOB SERPL: 0.9 G/DL
ALBUMIN/GLOB SERPL: 1 G/DL
ALBUMIN/GLOB SERPL: 1.2 G/DL
ALBUMIN/GLOB SERPL: 1.5 G/DL
ALP SERPL-CCNC: 118 U/L (ref 39–117)
ALP SERPL-CCNC: 124 U/L (ref 39–117)
ALP SERPL-CCNC: 136 U/L (ref 39–117)
ALP SERPL-CCNC: 88 U/L (ref 39–117)
ALT SERPL W P-5'-P-CCNC: 5 U/L (ref 1–41)
ALT SERPL W P-5'-P-CCNC: 5 U/L (ref 1–41)
ALT SERPL W P-5'-P-CCNC: 6 U/L (ref 1–41)
ALT SERPL W P-5'-P-CCNC: <5 U/L (ref 1–41)
AMPHET+METHAMPHET UR QL: NEGATIVE
AMPHETAMINES UR QL: NEGATIVE
ANION GAP SERPL CALCULATED.3IONS-SCNC: 13 MMOL/L (ref 5–15)
ANION GAP SERPL CALCULATED.3IONS-SCNC: 17 MMOL/L (ref 5–15)
ANION GAP SERPL CALCULATED.3IONS-SCNC: 19 MMOL/L (ref 5–15)
ANION GAP SERPL CALCULATED.3IONS-SCNC: 9 MMOL/L (ref 5–15)
APTT PPP: 31.3 SECONDS (ref 20–40.3)
APTT PPP: 35.6 SECONDS (ref 20–40.3)
ARTERIAL PATENCY WRIST A: ABNORMAL
ARTERIAL PATENCY WRIST A: ABNORMAL
AST SERPL-CCNC: 10 U/L (ref 1–40)
AST SERPL-CCNC: 13 U/L (ref 1–40)
AST SERPL-CCNC: 18 U/L (ref 1–40)
AST SERPL-CCNC: 27 U/L (ref 1–40)
ATMOSPHERIC PRESS: 745 MMHG
ATMOSPHERIC PRESS: 745 MMHG
BACTERIA SPEC AEROBE CULT: NORMAL
BACTERIA SPEC AEROBE CULT: NORMAL
BACTERIA UR QL AUTO: ABNORMAL /HPF
BARBITURATES UR QL SCN: NEGATIVE
BASE EXCESS BLDA CALC-SCNC: -14.8 MMOL/L (ref 0–2)
BASE EXCESS BLDA CALC-SCNC: -4.1 MMOL/L (ref 0–2)
BASOPHILS # BLD AUTO: 0.02 10*3/MM3 (ref 0–0.2)
BASOPHILS # BLD AUTO: 0.03 10*3/MM3 (ref 0–0.2)
BASOPHILS # BLD AUTO: 0.08 10*3/MM3 (ref 0–0.2)
BASOPHILS NFR BLD AUTO: 0.2 % (ref 0–1.5)
BASOPHILS NFR BLD AUTO: 0.2 % (ref 0–1.5)
BASOPHILS NFR BLD AUTO: 1 % (ref 0–1.5)
BDY SITE: ABNORMAL
BDY SITE: ABNORMAL
BENZODIAZ UR QL SCN: NEGATIVE
BILIRUB SERPL-MCNC: 0.3 MG/DL (ref 0–1.2)
BILIRUB SERPL-MCNC: 0.4 MG/DL (ref 0–1.2)
BILIRUB UR QL STRIP: NEGATIVE
BUN SERPL-MCNC: 35 MG/DL (ref 8–23)
BUN SERPL-MCNC: 67 MG/DL (ref 8–23)
BUN SERPL-MCNC: 68 MG/DL (ref 8–23)
BUN SERPL-MCNC: 86 MG/DL (ref 8–23)
BUN/CREAT SERPL: 30.4 (ref 7–25)
BUN/CREAT SERPL: 36 (ref 7–25)
BUN/CREAT SERPL: 37.4 (ref 7–25)
BUN/CREAT SERPL: 38.7 (ref 7–25)
BUPRENORPHINE SERPL-MCNC: NEGATIVE NG/ML
CA-I BLD-MCNC: 6.62 MG/DL (ref 4.6–5.6)
CA-I BLD-MCNC: 7.17 MG/DL (ref 4.6–5.6)
CA-I BLD-MCNC: >7.31 MG/DL (ref 4.6–5.6)
CALCIUM SPEC-SCNC: 12.2 MG/DL (ref 8.6–10.5)
CALCIUM SPEC-SCNC: 13 MG/DL (ref 8.6–10.5)
CALCIUM SPEC-SCNC: 15.1 MG/DL (ref 8.6–10.5)
CALCIUM SPEC-SCNC: 16.3 MG/DL (ref 8.6–10.5)
CANNABINOIDS SERPL QL: NEGATIVE
CHLORIDE SERPL-SCNC: 104 MMOL/L (ref 98–107)
CHLORIDE SERPL-SCNC: 112 MMOL/L (ref 98–107)
CHLORIDE SERPL-SCNC: 116 MMOL/L (ref 98–107)
CHLORIDE SERPL-SCNC: 99 MMOL/L (ref 98–107)
CK SERPL-CCNC: 169 U/L (ref 20–200)
CLARITY UR: ABNORMAL
CO2 SERPL-SCNC: 15 MMOL/L (ref 22–29)
CO2 SERPL-SCNC: 19 MMOL/L (ref 22–29)
CO2 SERPL-SCNC: 23 MMOL/L (ref 22–29)
CO2 SERPL-SCNC: 27 MMOL/L (ref 22–29)
COARSE GRAN CASTS URNS QL MICRO: ABNORMAL /LPF
COCAINE UR QL: NEGATIVE
COD CRY URNS QL: ABNORMAL /HPF
COLOR UR: ABNORMAL
CREAT SERPL-MCNC: 1.15 MG/DL (ref 0.76–1.27)
CREAT SERPL-MCNC: 1.79 MG/DL (ref 0.76–1.27)
CREAT SERPL-MCNC: 1.89 MG/DL (ref 0.76–1.27)
CREAT SERPL-MCNC: 2.22 MG/DL (ref 0.76–1.27)
CRP SERPL-MCNC: 3.11 MG/DL (ref 0–0.5)
D-LACTATE SERPL-SCNC: 1.5 MMOL/L (ref 0.5–2)
D-LACTATE SERPL-SCNC: 1.7 MMOL/L (ref 0.5–2)
DEPRECATED RDW RBC AUTO: 43.7 FL (ref 37–54)
DEPRECATED RDW RBC AUTO: 43.9 FL (ref 37–54)
DEPRECATED RDW RBC AUTO: 45.1 FL (ref 37–54)
DEPRECATED RDW RBC AUTO: 47.2 FL (ref 37–54)
EGFRCR SERPLBLD CKD-EPI 2021: 29.2 ML/MIN/1.73
EGFRCR SERPLBLD CKD-EPI 2021: 35.4 ML/MIN/1.73
EGFRCR SERPLBLD CKD-EPI 2021: 37.8 ML/MIN/1.73
EGFRCR SERPLBLD CKD-EPI 2021: 64.3 ML/MIN/1.73
EOSINOPHIL # BLD AUTO: 0.01 10*3/MM3 (ref 0–0.4)
EOSINOPHIL # BLD AUTO: 0.01 10*3/MM3 (ref 0–0.4)
EOSINOPHIL # BLD AUTO: 0.04 10*3/MM3 (ref 0–0.4)
EOSINOPHIL NFR BLD AUTO: 0.1 % (ref 0.3–6.2)
EOSINOPHIL NFR BLD AUTO: 0.1 % (ref 0.3–6.2)
EOSINOPHIL NFR BLD AUTO: 0.5 % (ref 0.3–6.2)
ERYTHROCYTE [DISTWIDTH] IN BLOOD BY AUTOMATED COUNT: 12.7 % (ref 12.3–15.4)
ERYTHROCYTE [DISTWIDTH] IN BLOOD BY AUTOMATED COUNT: 12.8 % (ref 12.3–15.4)
ERYTHROCYTE [DISTWIDTH] IN BLOOD BY AUTOMATED COUNT: 12.8 % (ref 12.3–15.4)
ERYTHROCYTE [DISTWIDTH] IN BLOOD BY AUTOMATED COUNT: 12.9 % (ref 12.3–15.4)
ETHANOL BLD-MCNC: <10 MG/DL (ref 0–10)
ETHANOL UR QL: <0.01 %
FLUAV RNA RESP QL NAA+PROBE: NOT DETECTED
FLUBV RNA RESP QL NAA+PROBE: NOT DETECTED
FOLATE SERPL-MCNC: 6.87 NG/ML (ref 4.78–24.2)
GLOBULIN UR ELPH-MCNC: 2.2 GM/DL
GLOBULIN UR ELPH-MCNC: 2.8 GM/DL
GLOBULIN UR ELPH-MCNC: 3.2 GM/DL
GLOBULIN UR ELPH-MCNC: 4 GM/DL
GLUCOSE BLDC GLUCOMTR-MCNC: 126 MG/DL (ref 70–130)
GLUCOSE BLDC GLUCOMTR-MCNC: 135 MG/DL (ref 70–130)
GLUCOSE BLDC GLUCOMTR-MCNC: 67 MG/DL (ref 70–130)
GLUCOSE SERPL-MCNC: 108 MG/DL (ref 65–99)
GLUCOSE SERPL-MCNC: 112 MG/DL (ref 65–99)
GLUCOSE SERPL-MCNC: 114 MG/DL (ref 65–99)
GLUCOSE SERPL-MCNC: 99 MG/DL (ref 65–99)
GLUCOSE UR STRIP-MCNC: NEGATIVE MG/DL
GRAN CASTS URNS QL MICRO: ABNORMAL /LPF
HBA1C MFR BLD: 5.6 % (ref 4.8–5.6)
HCO3 BLDA-SCNC: 10.2 MMOL/L (ref 20–26)
HCO3 BLDA-SCNC: 20.3 MMOL/L (ref 20–26)
HCT VFR BLD AUTO: 32.9 % (ref 37.5–51)
HCT VFR BLD AUTO: 43 % (ref 37.5–51)
HCT VFR BLD AUTO: 44.4 % (ref 37.5–51)
HCT VFR BLD AUTO: 44.9 % (ref 37.5–51)
HGB BLD-MCNC: 11.2 G/DL (ref 13–17.7)
HGB BLD-MCNC: 13.9 G/DL (ref 13–17.7)
HGB BLD-MCNC: 15 G/DL (ref 13–17.7)
HGB BLD-MCNC: 15.1 G/DL (ref 13–17.7)
HGB UR QL STRIP.AUTO: ABNORMAL
HOLD SPECIMEN: NORMAL
HYALINE CASTS UR QL AUTO: ABNORMAL /LPF
IMM GRANULOCYTES # BLD AUTO: 0.03 10*3/MM3 (ref 0–0.05)
IMM GRANULOCYTES # BLD AUTO: 0.04 10*3/MM3 (ref 0–0.05)
IMM GRANULOCYTES # BLD AUTO: 0.12 10*3/MM3 (ref 0–0.05)
IMM GRANULOCYTES NFR BLD AUTO: 0.4 % (ref 0–0.5)
IMM GRANULOCYTES NFR BLD AUTO: 0.5 % (ref 0–0.5)
IMM GRANULOCYTES NFR BLD AUTO: 0.8 % (ref 0–0.5)
INHALED O2 CONCENTRATION: <21 %
INHALED O2 CONCENTRATION: <21 %
INR PPP: 1.63 (ref 0.8–1.2)
INR PPP: 1.75 (ref 0.8–1.2)
KETONES UR QL STRIP: ABNORMAL
LDH SERPL-CCNC: 175 U/L (ref 135–225)
LEUKOCYTE ESTERASE UR QL STRIP.AUTO: ABNORMAL
LIPASE SERPL-CCNC: 258 U/L (ref 13–60)
LYMPHOCYTES # BLD AUTO: 0.71 10*3/MM3 (ref 0.7–3.1)
LYMPHOCYTES # BLD AUTO: 0.86 10*3/MM3 (ref 0.7–3.1)
LYMPHOCYTES # BLD AUTO: 1.23 10*3/MM3 (ref 0.7–3.1)
LYMPHOCYTES NFR BLD AUTO: 10 % (ref 19.6–45.3)
LYMPHOCYTES NFR BLD AUTO: 14.7 % (ref 19.6–45.3)
LYMPHOCYTES NFR BLD AUTO: 4.6 % (ref 19.6–45.3)
Lab: ABNORMAL
Lab: ABNORMAL
MAGNESIUM SERPL-MCNC: 1.7 MG/DL (ref 1.6–2.4)
MAGNESIUM SERPL-MCNC: 2.3 MG/DL (ref 1.6–2.4)
MCH RBC QN AUTO: 31.8 PG (ref 26.6–33)
MCH RBC QN AUTO: 32.1 PG (ref 26.6–33)
MCH RBC QN AUTO: 32.2 PG (ref 26.6–33)
MCH RBC QN AUTO: 32.4 PG (ref 26.6–33)
MCHC RBC AUTO-ENTMCNC: 32.3 G/DL (ref 31.5–35.7)
MCHC RBC AUTO-ENTMCNC: 33.4 G/DL (ref 31.5–35.7)
MCHC RBC AUTO-ENTMCNC: 34 G/DL (ref 31.5–35.7)
MCHC RBC AUTO-ENTMCNC: 34 G/DL (ref 31.5–35.7)
MCV RBC AUTO: 93.5 FL (ref 79–97)
MCV RBC AUTO: 94.7 FL (ref 79–97)
MCV RBC AUTO: 97 FL (ref 79–97)
MCV RBC AUTO: 99.3 FL (ref 79–97)
METHADONE UR QL SCN: NEGATIVE
MODALITY: ABNORMAL
MODALITY: ABNORMAL
MONOCYTES # BLD AUTO: 0.48 10*3/MM3 (ref 0.1–0.9)
MONOCYTES # BLD AUTO: 0.63 10*3/MM3 (ref 0.1–0.9)
MONOCYTES # BLD AUTO: 0.74 10*3/MM3 (ref 0.1–0.9)
MONOCYTES NFR BLD AUTO: 4.8 % (ref 5–12)
MONOCYTES NFR BLD AUTO: 5.7 % (ref 5–12)
MONOCYTES NFR BLD AUTO: 7.4 % (ref 5–12)
MUCOUS THREADS URNS QL MICRO: ABNORMAL /HPF
NEUTROPHILS NFR BLD AUTO: 13.87 10*3/MM3 (ref 1.7–7)
NEUTROPHILS NFR BLD AUTO: 6.48 10*3/MM3 (ref 1.7–7)
NEUTROPHILS NFR BLD AUTO: 7.02 10*3/MM3 (ref 1.7–7)
NEUTROPHILS NFR BLD AUTO: 77.6 % (ref 42.7–76)
NEUTROPHILS NFR BLD AUTO: 81.9 % (ref 42.7–76)
NEUTROPHILS NFR BLD AUTO: 89.5 % (ref 42.7–76)
NITRITE UR QL STRIP: NEGATIVE
NRBC BLD AUTO-RTO: 0 /100 WBC (ref 0–0.2)
OPIATES UR QL: POSITIVE
OXYCODONE UR QL SCN: NEGATIVE
PCO2 BLDA: 20.9 MM HG (ref 35–45)
PCO2 BLDA: 34.6 MM HG (ref 35–45)
PCP UR QL SCN: NEGATIVE
PH BLDA: 7.3 PH UNITS (ref 7.35–7.45)
PH BLDA: 7.38 PH UNITS (ref 7.35–7.45)
PH UR STRIP.AUTO: <=5 [PH] (ref 5–9)
PLATELET # BLD AUTO: 153 10*3/MM3 (ref 140–450)
PLATELET # BLD AUTO: 196 10*3/MM3 (ref 140–450)
PLATELET # BLD AUTO: 300 10*3/MM3 (ref 140–450)
PLATELET # BLD AUTO: 314 10*3/MM3 (ref 140–450)
PMV BLD AUTO: 11.2 FL (ref 6–12)
PMV BLD AUTO: 11.5 FL (ref 6–12)
PMV BLD AUTO: 11.7 FL (ref 6–12)
PMV BLD AUTO: 12.2 FL (ref 6–12)
PO2 BLDA: 26.3 MM HG (ref 83–108)
PO2 BLDA: 85.1 MM HG (ref 83–108)
POTASSIUM SERPL-SCNC: 2.9 MMOL/L (ref 3.5–5.2)
POTASSIUM SERPL-SCNC: 3.6 MMOL/L (ref 3.5–5.2)
POTASSIUM SERPL-SCNC: 4.3 MMOL/L (ref 3.5–5.2)
POTASSIUM SERPL-SCNC: 4.4 MMOL/L (ref 3.5–5.2)
PROCALCITONIN SERPL-MCNC: 0.09 NG/ML (ref 0–0.25)
PROPOXYPH UR QL: NEGATIVE
PROT SERPL-MCNC: 4.9 G/DL (ref 6–8.5)
PROT SERPL-MCNC: 6.5 G/DL (ref 6–8.5)
PROT SERPL-MCNC: 7 G/DL (ref 6–8.5)
PROT SERPL-MCNC: 7.7 G/DL (ref 6–8.5)
PROT UR QL STRIP: ABNORMAL
PROTHROMBIN TIME: 19.3 SECONDS (ref 11.1–15.3)
PROTHROMBIN TIME: 20.4 SECONDS (ref 11.1–15.3)
PSA SERPL-MCNC: 1.07 NG/ML (ref 0–4)
PTH RELATED PROT SERPL-SCNC: <2 PMOL/L
PTH-INTACT SERPL-MCNC: 10.3 PG/ML (ref 15–65)
PTH-INTACT SERPL-MCNC: 12.3 PG/ML (ref 15–65)
QT INTERVAL: 310 MS
QTC INTERVAL: 430 MS
RBC # BLD AUTO: 3.52 10*6/MM3 (ref 4.14–5.8)
RBC # BLD AUTO: 4.33 10*6/MM3 (ref 4.14–5.8)
RBC # BLD AUTO: 4.63 10*6/MM3 (ref 4.14–5.8)
RBC # BLD AUTO: 4.69 10*6/MM3 (ref 4.14–5.8)
RBC # UR STRIP: ABNORMAL /HPF
REF LAB TEST METHOD: ABNORMAL
SAO2 % BLDCOA: 43.6 % (ref 94–99)
SAO2 % BLDCOA: 97.1 % (ref 94–99)
SARS-COV-2 RNA RESP QL NAA+PROBE: NOT DETECTED
SODIUM SERPL-SCNC: 139 MMOL/L (ref 136–145)
SODIUM SERPL-SCNC: 142 MMOL/L (ref 136–145)
SODIUM SERPL-SCNC: 144 MMOL/L (ref 136–145)
SODIUM SERPL-SCNC: 148 MMOL/L (ref 136–145)
SP GR UR STRIP: 1.02 (ref 1–1.03)
SQUAMOUS #/AREA URNS HPF: ABNORMAL /HPF
T4 FREE SERPL-MCNC: 1.11 NG/DL (ref 0.93–1.7)
T4 FREE SERPL-MCNC: 1.28 NG/DL (ref 0.93–1.7)
TRANS CELLS #/AREA URNS HPF: ABNORMAL /HPF
TRICYCLICS UR QL SCN: NEGATIVE
TROPONIN T SERPL-MCNC: 0.04 NG/ML (ref 0–0.03)
TROPONIN T SERPL-MCNC: 0.05 NG/ML (ref 0–0.03)
TROPONIN T SERPL-MCNC: 0.05 NG/ML (ref 0–0.03)
TSH SERPL DL<=0.05 MIU/L-ACNC: 0.55 UIU/ML (ref 0.27–4.2)
TSH SERPL DL<=0.05 MIU/L-ACNC: 0.58 UIU/ML (ref 0.27–4.2)
UROBILINOGEN UR QL STRIP: ABNORMAL
VENTILATOR MODE: ABNORMAL
VENTILATOR MODE: ABNORMAL
VIT B12 BLD-MCNC: 509 PG/ML (ref 211–946)
WBC # UR STRIP: ABNORMAL /HPF
WBC NRBC COR # BLD: 15.48 10*3/MM3 (ref 3.4–10.8)
WBC NRBC COR # BLD: 7.71 10*3/MM3 (ref 3.4–10.8)
WBC NRBC COR # BLD: 8.35 10*3/MM3 (ref 3.4–10.8)
WBC NRBC COR # BLD: 8.57 10*3/MM3 (ref 3.4–10.8)

## 2022-01-01 PROCEDURE — G0156 HHCP-SVS OF AIDE,EA 15 MIN: HCPCS

## 2022-01-01 PROCEDURE — 83605 ASSAY OF LACTIC ACID: CPT | Performed by: EMERGENCY MEDICINE

## 2022-01-01 PROCEDURE — 25010000002 NALOXONE HCL 2 MG/2ML SOLUTION PREFILLED SYRINGE: Performed by: EMERGENCY MEDICINE

## 2022-01-01 PROCEDURE — 25010000002 HEPARIN (PORCINE) PER 1000 UNITS: Performed by: INTERNAL MEDICINE

## 2022-01-01 PROCEDURE — 6520001 HSPC ROUTINE CARE

## 2022-01-01 PROCEDURE — 80306 DRUG TEST PRSMV INSTRMNT: CPT | Performed by: EMERGENCY MEDICINE

## 2022-01-01 PROCEDURE — 80053 COMPREHEN METABOLIC PANEL: CPT | Performed by: FAMILY MEDICINE

## 2022-01-01 PROCEDURE — 84439 ASSAY OF FREE THYROXINE: CPT | Performed by: EMERGENCY MEDICINE

## 2022-01-01 PROCEDURE — G0299 HHS/HOSPICE OF RN EA 15 MIN: HCPCS

## 2022-01-01 PROCEDURE — 74176 CT ABD & PELVIS W/O CONTRAST: CPT

## 2022-01-01 PROCEDURE — 85730 THROMBOPLASTIN TIME PARTIAL: CPT | Performed by: EMERGENCY MEDICINE

## 2022-01-01 PROCEDURE — 25010000002 CEFTRIAXONE PER 250 MG: Performed by: EMERGENCY MEDICINE

## 2022-01-01 PROCEDURE — 71045 X-RAY EXAM CHEST 1 VIEW: CPT

## 2022-01-01 PROCEDURE — 36415 COLL VENOUS BLD VENIPUNCTURE: CPT | Performed by: EMERGENCY MEDICINE

## 2022-01-01 PROCEDURE — 99283 EMERGENCY DEPT VISIT LOW MDM: CPT

## 2022-01-01 PROCEDURE — 82962 GLUCOSE BLOOD TEST: CPT

## 2022-01-01 PROCEDURE — 84484 ASSAY OF TROPONIN QUANT: CPT | Performed by: EMERGENCY MEDICINE

## 2022-01-01 PROCEDURE — 25010000002 THIAMINE PER 100 MG: Performed by: EMERGENCY MEDICINE

## 2022-01-01 PROCEDURE — 84443 ASSAY THYROID STIM HORMONE: CPT | Performed by: EMERGENCY MEDICINE

## 2022-01-01 PROCEDURE — 83735 ASSAY OF MAGNESIUM: CPT | Performed by: INTERNAL MEDICINE

## 2022-01-01 PROCEDURE — 85025 COMPLETE CBC W/AUTO DIFF WBC: CPT | Performed by: EMERGENCY MEDICINE

## 2022-01-01 PROCEDURE — 80053 COMPREHEN METABOLIC PANEL: CPT | Performed by: INTERNAL MEDICINE

## 2022-01-01 PROCEDURE — 82397 CHEMILUMINESCENT ASSAY: CPT | Performed by: FAMILY MEDICINE

## 2022-01-01 PROCEDURE — 87040 BLOOD CULTURE FOR BACTERIA: CPT | Performed by: EMERGENCY MEDICINE

## 2022-01-01 PROCEDURE — 36415 COLL VENOUS BLD VENIPUNCTURE: CPT

## 2022-01-01 PROCEDURE — 83036 HEMOGLOBIN GLYCOSYLATED A1C: CPT | Performed by: INTERNAL MEDICINE

## 2022-01-01 PROCEDURE — G0155 HHCP-SVS OF CSW,EA 15 MIN: HCPCS

## 2022-01-01 PROCEDURE — 93010 ELECTROCARDIOGRAM REPORT: CPT | Performed by: INTERNAL MEDICINE

## 2022-01-01 PROCEDURE — G0103 PSA SCREENING: HCPCS | Performed by: INTERNAL MEDICINE

## 2022-01-01 PROCEDURE — 85025 COMPLETE CBC W/AUTO DIFF WBC: CPT | Performed by: INTERNAL MEDICINE

## 2022-01-01 PROCEDURE — 85610 PROTHROMBIN TIME: CPT | Performed by: INTERNAL MEDICINE

## 2022-01-01 PROCEDURE — 85027 COMPLETE CBC AUTOMATED: CPT | Performed by: INTERNAL MEDICINE

## 2022-01-01 PROCEDURE — 51702 INSERT TEMP BLADDER CATH: CPT

## 2022-01-01 PROCEDURE — 83970 ASSAY OF PARATHORMONE: CPT | Performed by: EMERGENCY MEDICINE

## 2022-01-01 PROCEDURE — 85025 COMPLETE CBC W/AUTO DIFF WBC: CPT | Performed by: FAMILY MEDICINE

## 2022-01-01 PROCEDURE — 83615 LACTATE (LD) (LDH) ENZYME: CPT | Performed by: EMERGENCY MEDICINE

## 2022-01-01 PROCEDURE — 25010000002 MORPHINE PER 10 MG: Performed by: INTERNAL MEDICINE

## 2022-01-01 PROCEDURE — 86140 C-REACTIVE PROTEIN: CPT | Performed by: FAMILY MEDICINE

## 2022-01-01 PROCEDURE — 93005 ELECTROCARDIOGRAM TRACING: CPT | Performed by: EMERGENCY MEDICINE

## 2022-01-01 PROCEDURE — 83970 ASSAY OF PARATHORMONE: CPT | Performed by: FAMILY MEDICINE

## 2022-01-01 PROCEDURE — 82550 ASSAY OF CK (CPK): CPT | Performed by: EMERGENCY MEDICINE

## 2022-01-01 PROCEDURE — 82803 BLOOD GASES ANY COMBINATION: CPT

## 2022-01-01 PROCEDURE — 84145 PROCALCITONIN (PCT): CPT | Performed by: INTERNAL MEDICINE

## 2022-01-01 PROCEDURE — 83735 ASSAY OF MAGNESIUM: CPT | Performed by: EMERGENCY MEDICINE

## 2022-01-01 PROCEDURE — 82607 VITAMIN B-12: CPT | Performed by: INTERNAL MEDICINE

## 2022-01-01 PROCEDURE — 82077 ASSAY SPEC XCP UR&BREATH IA: CPT | Performed by: EMERGENCY MEDICINE

## 2022-01-01 PROCEDURE — 93005 ELECTROCARDIOGRAM TRACING: CPT

## 2022-01-01 PROCEDURE — 82746 ASSAY OF FOLIC ACID SERUM: CPT | Performed by: INTERNAL MEDICINE

## 2022-01-01 PROCEDURE — 82330 ASSAY OF CALCIUM: CPT | Performed by: EMERGENCY MEDICINE

## 2022-01-01 PROCEDURE — 84439 ASSAY OF FREE THYROXINE: CPT | Performed by: INTERNAL MEDICINE

## 2022-01-01 PROCEDURE — 83690 ASSAY OF LIPASE: CPT | Performed by: EMERGENCY MEDICINE

## 2022-01-01 PROCEDURE — 83605 ASSAY OF LACTIC ACID: CPT | Performed by: INTERNAL MEDICINE

## 2022-01-01 PROCEDURE — 70450 CT HEAD/BRAIN W/O DYE: CPT

## 2022-01-01 PROCEDURE — 85730 THROMBOPLASTIN TIME PARTIAL: CPT | Performed by: INTERNAL MEDICINE

## 2022-01-01 PROCEDURE — 82330 ASSAY OF CALCIUM: CPT

## 2022-01-01 PROCEDURE — 81001 URINALYSIS AUTO W/SCOPE: CPT | Performed by: EMERGENCY MEDICINE

## 2022-01-01 PROCEDURE — 85610 PROTHROMBIN TIME: CPT | Performed by: EMERGENCY MEDICINE

## 2022-01-01 PROCEDURE — 84443 ASSAY THYROID STIM HORMONE: CPT | Performed by: INTERNAL MEDICINE

## 2022-01-01 PROCEDURE — 99204 OFFICE O/P NEW MOD 45 MIN: CPT | Performed by: INTERNAL MEDICINE

## 2022-01-01 PROCEDURE — 82306 VITAMIN D 25 HYDROXY: CPT | Performed by: INTERNAL MEDICINE

## 2022-01-01 PROCEDURE — 82397 CHEMILUMINESCENT ASSAY: CPT | Performed by: EMERGENCY MEDICINE

## 2022-01-01 PROCEDURE — 36600 WITHDRAWAL OF ARTERIAL BLOOD: CPT

## 2022-01-01 PROCEDURE — 87636 SARSCOV2 & INF A&B AMP PRB: CPT | Performed by: EMERGENCY MEDICINE

## 2022-01-01 PROCEDURE — 80053 COMPREHEN METABOLIC PANEL: CPT | Performed by: EMERGENCY MEDICINE

## 2022-01-01 PROCEDURE — 82330 ASSAY OF CALCIUM: CPT | Performed by: FAMILY MEDICINE

## 2022-01-01 PROCEDURE — 99285 EMERGENCY DEPT VISIT HI MDM: CPT

## 2022-01-01 RX ORDER — SODIUM CHLORIDE 0.9 % (FLUSH) 0.9 %
10 SYRINGE (ML) INJECTION AS NEEDED
Status: DISCONTINUED | OUTPATIENT
Start: 2022-01-01 | End: 2022-01-01 | Stop reason: HOSPADM

## 2022-01-01 RX ORDER — NALOXONE HYDROCHLORIDE 1 MG/ML
2 INJECTION INTRAMUSCULAR; INTRAVENOUS; SUBCUTANEOUS ONCE
Status: COMPLETED | OUTPATIENT
Start: 2022-01-01 | End: 2022-01-01

## 2022-01-01 RX ORDER — DEXTROSE AND SODIUM CHLORIDE 5; .45 G/100ML; G/100ML
125 INJECTION, SOLUTION INTRAVENOUS CONTINUOUS
Status: DISCONTINUED | OUTPATIENT
Start: 2022-01-01 | End: 2022-01-01

## 2022-01-01 RX ORDER — SODIUM CHLORIDE 450 MG/100ML
125 INJECTION, SOLUTION INTRAVENOUS CONTINUOUS
Status: DISCONTINUED | OUTPATIENT
Start: 2022-01-01 | End: 2022-01-01

## 2022-01-01 RX ORDER — SCOLOPAMINE TRANSDERMAL SYSTEM 1 MG/1
1 PATCH, EXTENDED RELEASE TRANSDERMAL
Status: DISCONTINUED | OUTPATIENT
Start: 2022-01-01 | End: 2022-01-01 | Stop reason: HOSPADM

## 2022-01-01 RX ORDER — ATORVASTATIN CALCIUM 20 MG/1
20 TABLET, FILM COATED ORAL DAILY
Status: DISCONTINUED | OUTPATIENT
Start: 2022-01-01 | End: 2022-01-01

## 2022-01-01 RX ORDER — ASPIRIN 81 MG/1
81 TABLET ORAL DAILY
Status: DISCONTINUED | OUTPATIENT
Start: 2022-01-01 | End: 2022-01-01

## 2022-01-01 RX ORDER — NITROGLYCERIN 0.4 MG/1
0.4 TABLET SUBLINGUAL AS NEEDED
Status: DISCONTINUED | OUTPATIENT
Start: 2022-01-01 | End: 2022-01-01

## 2022-01-01 RX ORDER — MORPHINE SULFATE 100 MG/5ML
10 SOLUTION ORAL
Qty: 30 ML | Refills: 0 | Status: SHIPPED | OUTPATIENT
Start: 2022-01-01

## 2022-01-01 RX ORDER — MORPHINE SULFATE 2 MG/ML
2 INJECTION, SOLUTION INTRAMUSCULAR; INTRAVENOUS
Status: DISCONTINUED | OUTPATIENT
Start: 2022-01-01 | End: 2022-01-01 | Stop reason: HOSPADM

## 2022-01-01 RX ORDER — HEPARIN SODIUM 5000 [USP'U]/ML
5000 INJECTION, SOLUTION INTRAVENOUS; SUBCUTANEOUS EVERY 12 HOURS SCHEDULED
Status: DISCONTINUED | OUTPATIENT
Start: 2022-01-01 | End: 2022-01-01

## 2022-01-01 RX ORDER — SODIUM CHLORIDE 0.9 % (FLUSH) 0.9 %
10 SYRINGE (ML) INJECTION EVERY 12 HOURS SCHEDULED
Status: DISCONTINUED | OUTPATIENT
Start: 2022-01-01 | End: 2022-01-01 | Stop reason: HOSPADM

## 2022-01-01 RX ORDER — CLOPIDOGREL BISULFATE 75 MG/1
75 TABLET ORAL DAILY
Status: DISCONTINUED | OUTPATIENT
Start: 2022-01-01 | End: 2022-01-01

## 2022-01-01 RX ORDER — LATANOPROST 50 UG/ML
1 SOLUTION/ DROPS OPHTHALMIC NIGHTLY
Status: DISCONTINUED | OUTPATIENT
Start: 2022-01-01 | End: 2022-01-01 | Stop reason: HOSPADM

## 2022-01-01 RX ORDER — SODIUM CHLORIDE 0.9 % (FLUSH) 0.9 %
10 SYRINGE (ML) INJECTION AS NEEDED
Status: DISCONTINUED | OUTPATIENT
Start: 2022-01-01 | End: 2022-01-01

## 2022-01-01 RX ORDER — ESCITALOPRAM OXALATE 10 MG/1
20 TABLET ORAL DAILY
Status: DISCONTINUED | OUTPATIENT
Start: 2022-01-01 | End: 2022-01-01

## 2022-01-01 RX ORDER — HYDRALAZINE HYDROCHLORIDE 20 MG/ML
10 INJECTION INTRAMUSCULAR; INTRAVENOUS EVERY 6 HOURS PRN
Status: DISCONTINUED | OUTPATIENT
Start: 2022-01-01 | End: 2022-01-01

## 2022-01-01 RX ORDER — SODIUM CHLORIDE 9 MG/ML
125 INJECTION, SOLUTION INTRAVENOUS CONTINUOUS
Status: DISCONTINUED | OUTPATIENT
Start: 2022-01-01 | End: 2022-01-01

## 2022-01-01 RX ORDER — FAMOTIDINE 10 MG/ML
20 INJECTION, SOLUTION INTRAVENOUS NIGHTLY
Status: DISCONTINUED | OUTPATIENT
Start: 2022-01-01 | End: 2022-01-01

## 2022-01-01 RX ORDER — AMOXICILLIN 250 MG
2 CAPSULE ORAL 2 TIMES DAILY
Status: DISCONTINUED | OUTPATIENT
Start: 2022-01-01 | End: 2022-01-01

## 2022-01-01 RX ORDER — LORAZEPAM 2 MG/ML
1 INJECTION INTRAMUSCULAR EVERY 4 HOURS PRN
Status: DISCONTINUED | OUTPATIENT
Start: 2022-01-01 | End: 2022-01-01 | Stop reason: HOSPADM

## 2022-01-01 RX ORDER — DEXTROSE MONOHYDRATE 25 G/50ML
25 INJECTION, SOLUTION INTRAVENOUS
Status: DISCONTINUED | OUTPATIENT
Start: 2022-01-01 | End: 2022-01-01

## 2022-01-01 RX ADMIN — Medication 10 ML: at 21:09

## 2022-01-01 RX ADMIN — DEXTROSE AND SODIUM CHLORIDE 125 ML/HR: 5; 450 INJECTION, SOLUTION INTRAVENOUS at 00:58

## 2022-01-01 RX ADMIN — Medication 10 ML: at 08:17

## 2022-01-01 RX ADMIN — MORPHINE SULFATE 2 MG: 2 INJECTION, SOLUTION INTRAMUSCULAR; INTRAVENOUS at 01:39

## 2022-01-01 RX ADMIN — CEFTRIAXONE SODIUM 2 G: 2 INJECTION, POWDER, FOR SOLUTION INTRAMUSCULAR; INTRAVENOUS at 19:41

## 2022-01-01 RX ADMIN — HEPARIN SODIUM 5000 UNITS: 5000 INJECTION INTRAVENOUS; SUBCUTANEOUS at 22:48

## 2022-01-01 RX ADMIN — Medication 10 ML: at 20:18

## 2022-01-01 RX ADMIN — MORPHINE SULFATE 2 MG: 2 INJECTION, SOLUTION INTRAMUSCULAR; INTRAVENOUS at 14:41

## 2022-01-01 RX ADMIN — LATANOPROST 1 DROP: 50 SOLUTION OPHTHALMIC at 21:09

## 2022-01-01 RX ADMIN — SODIUM BICARBONATE 50 MEQ: 84 INJECTION INTRAVENOUS at 08:16

## 2022-01-01 RX ADMIN — SODIUM BICARBONATE 100 MEQ: 84 INJECTION, SOLUTION INTRAVENOUS at 09:10

## 2022-01-01 RX ADMIN — Medication 10 ML: at 22:49

## 2022-01-01 RX ADMIN — DEXTROSE MONOHYDRATE 25 ML: 25 INJECTION, SOLUTION INTRAVENOUS at 00:58

## 2022-01-01 RX ADMIN — MORPHINE SULFATE 2 MG: 2 INJECTION, SOLUTION INTRAMUSCULAR; INTRAVENOUS at 09:59

## 2022-01-01 RX ADMIN — SODIUM CHLORIDE 1000 ML: 9 INJECTION, SOLUTION INTRAVENOUS at 19:15

## 2022-01-01 RX ADMIN — NALOXONE HYDROCHLORIDE 2 MG: 1 INJECTION PARENTERAL at 18:31

## 2022-01-01 RX ADMIN — SODIUM CHLORIDE 1000 ML: 9 INJECTION, SOLUTION INTRAVENOUS at 18:17

## 2022-01-01 RX ADMIN — SCOPALAMINE 1 PATCH: 1 PATCH, EXTENDED RELEASE TRANSDERMAL at 09:59

## 2022-01-01 RX ADMIN — SODIUM CHLORIDE 125 ML/HR: 9 INJECTION, SOLUTION INTRAVENOUS at 18:31

## 2022-01-01 RX ADMIN — FAMOTIDINE 20 MG: 10 INJECTION, SOLUTION INTRAVENOUS at 22:49

## 2022-01-01 RX ADMIN — SODIUM CHLORIDE 125 ML/HR: 4.5 INJECTION, SOLUTION INTRAVENOUS at 00:28

## 2022-01-01 RX ADMIN — LATANOPROST 1 DROP: 50 SOLUTION OPHTHALMIC at 20:42

## 2022-01-01 RX ADMIN — SODIUM CHLORIDE 125 ML/HR: 4.5 INJECTION, SOLUTION INTRAVENOUS at 21:18

## 2022-01-01 RX ADMIN — MORPHINE SULFATE 2 MG: 2 INJECTION, SOLUTION INTRAMUSCULAR; INTRAVENOUS at 05:34

## 2022-01-01 RX ADMIN — THIAMINE HYDROCHLORIDE 100 MG: 100 INJECTION, SOLUTION INTRAMUSCULAR; INTRAVENOUS at 18:31

## 2022-01-01 RX ADMIN — SODIUM CHLORIDE 1000 ML: 9 INJECTION, SOLUTION INTRAVENOUS at 21:17

## 2022-03-21 PROBLEM — J43.2 CENTRILOBULAR EMPHYSEMA (HCC): Status: ACTIVE | Noted: 2022-01-01

## 2022-03-21 PROBLEM — F17.200 NICOTINE DEPENDENCE: Status: ACTIVE | Noted: 2022-01-01

## 2022-03-21 NOTE — PROGRESS NOTES
Pulmonary Consultation    Zelalem Diaz MD,    Thank you for asking me to see Eduardo De Leon for   Chief Complaint   Patient presents with   • Abnormal Chest X-ray   .      History of Present Illness  Eduardo De Leon is a 81 y.o. male      Patient referred from [facility] for abnormal Chest CT  He had a recent routine CXR and labs. CXR showed rib fractures which prompted a chest CT.  I reviewed these images personally.There was a single enlarged hilar LN. No concerning pulmonary nodules. There was notable emphysema that was not commented on in the report.     Patient gets winded walking around his house. He has a lot of back and MSK pain as well. Is really only taking his MS Contin lately. He is a lifelong smoker. Denies ever seeing a pulmonologist or being in any inhalers or breathing treatments      Tobacco use history:  Type: cigarettes  Amount: 1 ppd  Duration: 60 years  Cessation: no         Review of Systems: History obtained from chart review and the patient.  Review of Systems   Constitutional: Positive for unexpected weight change. Negative for chills, diaphoresis and fever.   Respiratory: Positive for shortness of breath. Negative for cough.      As described in the HPI. Otherwise, remainder of ROS (14 systems) were negative.    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   • Primary open angle glaucoma of left eye, mild stage   • Primary open angle glaucoma of both eyes, mild stage   • Transient alteration of awareness   • CAP (community acquired pneumonia)   • Narcotic overdose (HCC)   • Type 2 diabetes mellitus (HCC)   • Essential hypertension   • Centrilobular emphysema (HCC)   • Nicotine dependence         Current Outpatient Medications:   •  Morphine (MS CONTIN) 30 MG 12 hr tablet, Take 15 mg by mouth Every 8 (Eight) Hours., Disp: , Rfl:   •  aspirin 81 MG tablet, Take  81 mg by mouth daily., Disp: , Rfl:   •  atorvastatin (LIPITOR) 20 MG tablet, Take 20 mg by mouth daily., Disp: , Rfl:   •  clopidogrel (PLAVIX) 75 MG tablet, Take 75 mg by mouth daily., Disp: , Rfl:   •  colestipol (COLESTID) 1 G tablet, Take 1 g by mouth 3 (three) times a day., Disp: , Rfl:   •  escitalopram (LEXAPRO) 10 MG tablet, Take 20 mg by mouth Daily., Disp: , Rfl:   •  gabapentin (NEURONTIN) 600 MG tablet, Take 600 mg by mouth 3 (Three) Times a Day., Disp: , Rfl:   •  glucose blood test strip, 1 strip., Disp: , Rfl:   •  latanoprost (XALATAN) 0.005 % ophthalmic solution, Administer 1 drop into the left eye Every Night., Disp: 1 each, Rfl: 11  •  metFORMIN (GLUCOPHAGE) 500 MG tablet, TK 1 T PO BID WITH MEALS, Disp: , Rfl: 2  •  nitroglycerin (NITROSTAT) 0.4 MG SL tablet, Place 0.4 mg under the tongue As Needed for Chest Pain. 1 tablet, sublingual as needed Sublingual PRN, Disp: , Rfl:   •  quinapril (ACCUPRIL) 20 MG tablet, Take 20 mg by mouth daily., Disp: , Rfl:     Allergies   Allergen Reactions   • Fentanyl Other (See Comments)     Other reaction(s): Other (see comments)     • Iodinated Diagnostic Agents        Past Medical History:   Diagnosis Date   • Borderline glaucoma     POSSIBLE   • Colon polyp    • Cortical senile cataract    • Diarrhea    • Diarrhea of presumed infectious origin    • GERD (gastroesophageal reflux disease)    • History of echocardiogram 06/02/2014    Normal LV systolic function. Grade 1 diastolic dysfunction of the left ventricular myocardium. No evidence of pericardial effusion. No evidence of intracardiac thrombus.   • Hyperlipidemia    • Hypertension    • Inguinal pain    • Nuclear cataract    • Primary open angle glaucoma    • Right inguinal hernia      Past Surgical History:   Procedure Laterality Date   • COLONOSCOPY  05/08/2014    There are a few spotty, minimal areas of erythema in sigmoid colon. Multiple biopsies taken. Fluid aspiration performed. Hemorrhoids found.   •  "OTHER SURGICAL HISTORY  07/26/2016    EXTENDED VISUAL FIELDS STUDY 25985 (Primary open angle glaucoma)    • OTHER SURGICAL HISTORY  06/21/2016    FUNDUS PHOTOGRAPHY 96214 (Open angle with borderline findings, low risk, unspecified eye)    • OTHER SURGICAL HISTORY  06/21/2016    DISC NFL 46664 (Open angle with borderline findings, low risk, unspecified eye)      Social History     Socioeconomic History   • Marital status:    Tobacco Use   • Smoking status: Current Every Day Smoker   • Smokeless tobacco: Never Used   Substance and Sexual Activity   • Alcohol use: No   • Drug use: Never   • Sexual activity: Defer     Family History   Problem Relation Age of Onset   • Lung cancer Mother    • Diabetes Other    • Heart disease Other    • Hypertension Other           Objective     Blood pressure 138/90, pulse 80, temperature 97.5 °F (36.4 °C), height 177.8 cm (70\"), weight 53.1 kg (117 lb), SpO2 97 %.  Physical Exam  Vitals reviewed.   Constitutional:       Appearance: Normal appearance. He is underweight.   HENT:      Head: Normocephalic and atraumatic.      Right Ear: Tympanic membrane normal.      Left Ear: Tympanic membrane normal.      Nose: Nose normal.      Mouth/Throat:      Mouth: Mucous membranes are moist.      Pharynx: Oropharynx is clear.   Eyes:      Conjunctiva/sclera: Conjunctivae normal.      Pupils: Pupils are equal, round, and reactive to light.   Cardiovascular:      Rate and Rhythm: Normal rate and regular rhythm.      Pulses: Normal pulses.      Heart sounds: Normal heart sounds.   Pulmonary:      Effort: Pulmonary effort is normal.      Breath sounds: Normal breath sounds.   Abdominal:      General: Abdomen is flat. Bowel sounds are normal.      Palpations: Abdomen is soft.   Musculoskeletal:         General: Normal range of motion.      Cervical back: Normal range of motion and neck supple.      Comments: Mild clubbing bilaterally with nicotine stains   Skin:     General: Skin is warm and " dry.   Neurological:      General: No focal deficit present.      Mental Status: He is alert and oriented to person, place, and time.   Psychiatric:         Mood and Affect: Mood normal.         Behavior: Behavior normal.         PFTs:  (independently reviewed and interpreted by me)  None available    Radiology (independently reviewed and interpreted by me):   CT chest 3/16/22- 2cm R anterior hilalr LN, no mediastinal nodes. No concerning pulmonary nodules. Notable bilateral emphysema       Assessment/Plan     Diagnoses and all orders for this visit:    1. Centrilobular emphysema (HCC) (Primary)  -     Full Pulmonary Function Test With Bronchodilator; Future    2. Cigarette nicotine dependence with other nicotine-induced disorder         Discussion/ Recommendations:   Regarding the patients CT, it is extremely unusual for a single enlarged LN to represent malginancy. Lymphoma would have multiple enlarged nodes, and there is no pulmonary lesion to suggest a lung cancer primary. The scan was not done with contrast. Patient has a questionable contrast allergy (he does not know anything about having a contrast allergy and his ex wife thinks he had some reaction years ago but doesn't know what). There are not formal guidelines on how to follow a single enlarged hilar node. Patient would really need a contrasted study so will need to consider that down the road (probably sooner than 6 months but it's not an emergency right now)  The patient clearly has emphysema and I suspect it's severe, which can certainly cause weight loss. I'm going to check PFTs on him. He may benefit from treatment of COPD at least symptomatically. Reducing his smoking would certainly help with gaining back some weight but he's not interested in that right now.     -PFTs and follow up after  -Will discuss follow up imaging again at next visit             No follow-ups on file.      Thank you for allowing me to participate in the care of Eduardo Bell  Haley. Please do not hesitate to contact me with any questions.         This document has been electronically signed by Mae Ayala DO on March 21, 2022 12:08 CDT

## 2022-05-03 NOTE — TELEPHONE ENCOUNTER
Attempted to reach [facility] ([phone]) concerning Pulmonary rehab referral. No answer. :Left message including my name and call back number.

## 2022-06-08 NOTE — HOSPICE
Evaluated patient for admission to hospice with diagnosis of Alzheimer's Disease. Patient able to carry on a full lucid conversation and is alert and oriented X 3. Patient is able to raise from the recumbient position to a sitting position unaided. Patient asked ex-spouse to warm his coffee. Asked if it would bother this signee if he smoked and when informed that he was not to smoke while this signee was present in the home he acknowleded this fact and laid his cigarettes and lighter back down on the table. States that he is able to ambulate around the home with his walker and ex-spouse verifies this fact. Patient not appropriate for admission at this time. Admission deferred for now. Instructed to call hospice for any concerns.

## 2022-08-11 NOTE — HOSPICE
Patient questioned to determine eligibilty to sign for hospice admission. Patient knows his name, date of birth, that he lives in [place] and appropriately names the president. Hospice explained to patient including plan of care, ability to procure medications, and other services including having a doctor to manage his care. Patient is adamant that he does not wish to enroll in hospice.

## 2022-08-21 PROBLEM — R41.82 ALTERED MENTAL STATUS: Status: ACTIVE | Noted: 2022-01-01

## 2022-08-24 PROBLEM — E43 SEVERE MALNUTRITION (HCC): Status: ACTIVE | Noted: 2022-01-01

## 2022-08-24 NOTE — ED PROVIDER NOTES
Subjective   Patient presents emergency department with no complaint, patient is not able to verbalize at this time.  Patient is evidently a hospice patient who was discharged from the hospital today.  The ambulance that was transporting the patient noted the patient desaturating, were concerned that the patient was about to go into respiratory failure so they brought the patient to the emergency department.  As the patient is actually on hospice there is work going on at this time with case management for further management.  Does not appear patient is in need of acute intervention.            Review of Systems   Unable to perform ROS: Patient nonverbal       Past Medical History:   Diagnosis Date   • Borderline glaucoma     POSSIBLE   • Colon polyp    • Cortical senile cataract    • Diarrhea    • Diarrhea of presumed infectious origin    • GERD (gastroesophageal reflux disease)    • History of echocardiogram 06/02/2014    Normal LV systolic function. Grade 1 diastolic dysfunction of the left ventricular myocardium. No evidence of pericardial effusion. No evidence of intracardiac thrombus.   • Hyperlipidemia    • Hypertension    • Inguinal pain    • Nuclear cataract    • Primary open angle glaucoma    • Right inguinal hernia        Allergies   Allergen Reactions   • Fentanyl Other (See Comments)     Other reaction(s): Other (see comments)     • Iodinated Diagnostic Agents        Past Surgical History:   Procedure Laterality Date   • COLONOSCOPY  05/08/2014    There are a few spotty, minimal areas of erythema in sigmoid colon. Multiple biopsies taken. Fluid aspiration performed. Hemorrhoids found.   • OTHER SURGICAL HISTORY  07/26/2016    EXTENDED VISUAL FIELDS STUDY 14119 (Primary open angle glaucoma)    • OTHER SURGICAL HISTORY  06/21/2016    FUNDUS PHOTOGRAPHY 35570 (Open angle with borderline findings, low risk, unspecified eye)    • OTHER SURGICAL HISTORY  06/21/2016    DISC NFL 09804 (Open angle with  borderline findings, low risk, unspecified eye)        Family History   Problem Relation Age of Onset   • Lung cancer Mother    • Diabetes Other    • Heart disease Other    • Hypertension Other        Social History     Socioeconomic History   • Marital status:    Tobacco Use   • Smoking status: Current Every Day Smoker   • Smokeless tobacco: Never Used   Substance and Sexual Activity   • Alcohol use: No   • Drug use: Never   • Sexual activity: Defer           Objective   Physical Exam  Constitutional:       General: He is not in acute distress.     Appearance: He is ill-appearing.      Comments: Debilitated deconditioned, in no acute distress.  Shallow respiratory effort.     HENT:      Head: Normocephalic and atraumatic.      Mouth/Throat:      Mouth: Mucous membranes are dry.   Cardiovascular:      Rate and Rhythm: Tachycardia present.      Pulses: Normal pulses.      Heart sounds: Normal heart sounds.   Pulmonary:      Effort: Respiratory distress present.      Comments: Shallow respiratory effort.    Abdominal:      General: Abdomen is flat. There is no distension.      Tenderness: There is no abdominal tenderness.   Musculoskeletal:      Cervical back: No rigidity.      Comments: Deconditioned.  Decreased capillary refill distally.     Lymphadenopathy:      Cervical: No cervical adenopathy.   Skin:     Capillary Refill: Capillary refill takes more than 3 seconds.      Coloration: Skin is pale.      Comments: Central shunting with purple discoloration of the distal extremities.     Neurological:      General: No focal deficit present.   Psychiatric:      Comments: Unable to assess.           Procedures           ED Course                                         Labs Reviewed - No data to display    No orders to display     Is reviewed with EMS and family.  Patient is under end-of-life care.  Current status is DNR.  Patient will be discharged home as per current plan despite his debilitation.  Patient is  a scheduled for home hospice.      MDM    Final diagnoses:   Metabolic encephalopathy   Multiple lesions of metastatic malignancy (HCC)       ED Disposition  ED Disposition     ED Disposition   Discharge    Condition   Stable    Comment   --             Zelalem Diaz MD  [address] [place] [address]  [phone]      As needed         Medication List      No changes were made to your prescriptions during this visit.          Benja Oconnor MD  08/24/22 0125

## 2022-08-24 NOTE — HOSPICE
Patient admitted to [facility] with diagnosis of: Alzheimer's dementia without behavioral disturbance to Dr. Yordy Bowman.  Care Giver reported pt. has been declining slowly until recent hospitalization.  Caregiver reported patient stopped taking any medications about a year ago and decreased eating to point she is unable to get him to eat or drink much at all prior to last hospitalization and he has been unable to swallow any pills for several months.  She reported he had become increasingly unsteady with multiple falls and currently is bedfast unable to help turn in the bed.  Caregiver reported when he was still able to talk he asked her name one morning but currently is nonverbal and she doesn't think he knows who she is anymore.  Pt. did not speak a word, was not able to drink water or take it out of the sponge of a mouth swab.  He was picking at sheet occasionally but no purposeful movements observed.  Family and friends stated he has been nonverbal and does not seem to recognize them.    Uncomfortable because of pain?  Patient is currently unable to report but nonverbal pain scale is zero.  Patient was sleeping much of time and aroused easily but fell back to sleep or remained quiet.    Other life Sustaining measures (IV/IV antibiotic, tube feeding, dialysis, intubation):  POA had reported he didn't want any further interventions and Patient is a DNR and Caregiver stated conversations with her son the POA revealed he didn't want patient to have IV's/IV antibiotics, tube feeding, dialysis, intubation, no CPR, or return to hospital for any care as nothing would improve his quality of life.    If patient has history of dyspnea, describe (include severity):  Patient has long history of smoking and during transport home reported he was cyanotic but patient does not use oxygen, have any history of respiratory treatments or distress noted at time of assessment. CG reports occational mild respiratory  distress observed but that patient does not complain himself. Medication profiled and attending approved opoid medication for pain or dyspnea.  Discussed use of a fan toward patients face if shortness of air observed.  Pt's. hands were cold and fingernails stained from smoking so could not see capillary refill.    Patient had bowel movement 8/23 per hospital report.  Patient has had little intake but profiled laxative and ordered from [facility].  Pt's. abdomen concave and patient appears very malnourished.    Discussed Spiritual?Existential concerns with:  spoke with patients caregiver/exwife/first wife (states she has taken care of him last 12 years.   did not report any concerns at present.    Instructed Patient and caregivers present that when new medications received to call Hospice to review contents before administration to ensure correct medication, dosage, and instructions understood prior to use of any new medications or when medications are not as expected per previous instructions.    Admission booklet explained and instruction given on hospice availability and how to access nurse 24 hour/day

## 2022-08-24 NOTE — HOSPICE
Pt. d/c from hospital and taken to home via ambulance.  Pts. son/POA had already signed admission papers to hospice and was awaiting his d/c from hospital.  Reviewed information about hospice on arrival to home where his first wife is taking care of him in her home.

## 2022-08-25 NOTE — HOSPICE
"Upon arrival to home patient resting in hospital bed with eyes open with blank stare, unresponsive Full body assessment performed see body systems charting and vital signs. Explained to family that morphine should be used for tachypneia as well as pain. Medications have not arrived from [facility] as of yet Medications ordered from [facility] to ensure that patient will have them shortly. Patient made imminent. Caregivers deny any needs at this time. Instructed to call for any needs, changes or concerns. Covid-19 prevention education completed per protocol. Patient remains hospice approriate. Stepdaughter instructed on use of Roxinol and instructed to call when it arrives prior to giving first dose. Instructed on how to administer Lorazepam as well. Patient staus changed to imminent. Educated caregiver on what imminent means. Also showed caregiver how to turn patient and prop him up with pillows and how to 'float\" heels off of the bed."

## 2022-08-26 NOTE — HOSPICE
Arrived at patient's home finding in HB unresponsive. Eyes were slightly opened but know response to verbal stimuli. ELIZABETH Meneses present and assessing patient's needs. Present was ex-wife, Kami Burden who was preparing for a doctors appointment. Also present was patient's stepdaughter,Leticia from previous marriage who is assisting Kami care for patient. Patient and Mrs. Burden were previously  and has two sons together. They were  and remarried other people and then their spouse passed away. Patient moved in with Mrs. Burden and became ill and she began caring for him in her home. Caregiver noted that she has been trying to get patient to go to the doctor but he refused. She said she has cared for patient for about 12 years and had struggled the last year or so attempting to get help in caring for him. Patient has had multiple falls and has been incontinent causing additional stress.     CG indicated that patient here recently patient refused to eat or drink and to go to the doctor until he was unresponsive and ambulance transported to the hospital. The family's goal is to keep him comfortable and to remain in the home. CG and stepdaughter are primary caregivers and said that patient's two sons lived out of state. Kami left for diagnostic testing and Corina expressed concern for Kami's health and her ability to drive. She said that Kami was wore out and that she needed rest. Leticia said that she lived locally but will be staying with Kami to help her.    The family advised that son, Yfn De Leon who lives in [place] is patient's POA.     Kami said that patient is to be cremated and that they will be using [employer].    The family verbalized understanding to contact RN 24/7 and Signee as needed.       Signee will complete follow up call to Kami and assess for further needs and offer support

## 2022-08-26 NOTE — HOSPICE
Imminent visit. Patient lying in hospital bed unresponsive with eyes half open. Not looking directly at anything or tracking - blank stare only. Urias to bedside drain with adequate amount of urine in collection bag, clear and dark yellow. See current vital signs. Full body assessment performed - see body systems charting. Caregiver denies any needs at this time. Instructed to call for any needs, changes or concerns. Covid-19 prevention education completed per protocol. Patient remains appropriate for hospice.

## 2022-08-29 VITALS
OXYGEN SATURATION: 70 % | TEMPERATURE: 99.6 F | HEART RATE: 107 BPM | RESPIRATION RATE: 16 BRPM | DIASTOLIC BLOOD PRESSURE: 60 MMHG | SYSTOLIC BLOOD PRESSURE: 80 MMHG

## 2022-08-29 LAB — PTH RELATED PROT SERPL-SCNC: <2 PMOL/L

## 2022-08-29 NOTE — HOSPICE
The patient expressed his pain level to our nurse who was present. There were no visible fall risks. I provided supplies - fire extinguisher. I will visit 1xs, monthly.    The patient's signficant other and I sat on her porch and talked about her history, the history of the patient, and present concerns she was experiencing as the primary caregiver for the patient.    I offered understanding, , emotional encouragement, a listening presence, and prayer.